# Patient Record
Sex: MALE | Race: WHITE | HISPANIC OR LATINO | Employment: UNEMPLOYED | URBAN - METROPOLITAN AREA
[De-identification: names, ages, dates, MRNs, and addresses within clinical notes are randomized per-mention and may not be internally consistent; named-entity substitution may affect disease eponyms.]

---

## 2017-01-26 ENCOUNTER — TRANSCRIBE ORDERS (OUTPATIENT)
Dept: LAB | Facility: CLINIC | Age: 72
End: 2017-01-26

## 2017-01-26 ENCOUNTER — APPOINTMENT (OUTPATIENT)
Dept: LAB | Facility: CLINIC | Age: 72
End: 2017-01-26
Payer: COMMERCIAL

## 2017-01-26 DIAGNOSIS — A64: Primary | ICD-10-CM

## 2017-01-26 DIAGNOSIS — N50.89 CHYLOCELE OF TUNICA VAGINALIS: ICD-10-CM

## 2017-01-26 DIAGNOSIS — E11.8 DIABETIC COMPLICATION (HCC): ICD-10-CM

## 2017-01-26 LAB
ALBUMIN SERPL BCP-MCNC: 4 G/DL (ref 3.5–5)
ALP SERPL-CCNC: 97 U/L (ref 46–116)
ALT SERPL W P-5'-P-CCNC: 28 U/L (ref 12–78)
ANION GAP SERPL CALCULATED.3IONS-SCNC: 9 MMOL/L (ref 4–13)
AST SERPL W P-5'-P-CCNC: 19 U/L (ref 5–45)
BILIRUB SERPL-MCNC: 0.74 MG/DL (ref 0.2–1)
BUN SERPL-MCNC: 12 MG/DL (ref 5–25)
CALCIUM SERPL-MCNC: 8.9 MG/DL (ref 8.3–10.1)
CHLORIDE SERPL-SCNC: 102 MMOL/L (ref 100–108)
CHOLEST SERPL-MCNC: 182 MG/DL (ref 50–200)
CO2 SERPL-SCNC: 27 MMOL/L (ref 21–32)
CREAT SERPL-MCNC: 1.03 MG/DL (ref 0.6–1.3)
EST. AVERAGE GLUCOSE BLD GHB EST-MCNC: 209 MG/DL
GFR SERPL CREATININE-BSD FRML MDRD: >60 ML/MIN/1.73SQ M
GLUCOSE SERPL-MCNC: 168 MG/DL (ref 65–140)
HBA1C MFR BLD: 8.9 % (ref 4.2–6.3)
HDLC SERPL-MCNC: 69 MG/DL (ref 40–60)
LDLC SERPL CALC-MCNC: 91 MG/DL (ref 0–100)
POTASSIUM SERPL-SCNC: 4.3 MMOL/L (ref 3.5–5.3)
PROT SERPL-MCNC: 8.3 G/DL (ref 6.4–8.2)
SODIUM SERPL-SCNC: 138 MMOL/L (ref 136–145)
TRIGL SERPL-MCNC: 109 MG/DL

## 2017-01-26 PROCEDURE — 86696 HERPES SIMPLEX TYPE 2 TEST: CPT

## 2017-01-26 PROCEDURE — 80061 LIPID PANEL: CPT

## 2017-01-26 PROCEDURE — 87389 HIV-1 AG W/HIV-1&-2 AB AG IA: CPT

## 2017-01-26 PROCEDURE — 83036 HEMOGLOBIN GLYCOSYLATED A1C: CPT

## 2017-01-26 PROCEDURE — 87591 N.GONORRHOEAE DNA AMP PROB: CPT

## 2017-01-26 PROCEDURE — 86592 SYPHILIS TEST NON-TREP QUAL: CPT

## 2017-01-26 PROCEDURE — 36415 COLL VENOUS BLD VENIPUNCTURE: CPT

## 2017-01-26 PROCEDURE — 87491 CHLMYD TRACH DNA AMP PROBE: CPT

## 2017-01-26 PROCEDURE — 80053 COMPREHEN METABOLIC PANEL: CPT

## 2017-01-26 PROCEDURE — 86694 HERPES SIMPLEX NES ANTBDY: CPT

## 2017-01-26 PROCEDURE — 86695 HERPES SIMPLEX TYPE 1 TEST: CPT

## 2017-01-27 LAB
CHLAMYDIA DNA CVX QL NAA+PROBE: NORMAL
HIV 1+2 AB+HIV1 P24 AG SERPL QL IA: NORMAL
HSV1 IGG SER IA-ACNC: 47 INDEX (ref 0–0.9)
HSV2 IGG SER IA-ACNC: 4.3 INDEX (ref 0–0.9)
N GONORRHOEA DNA GENITAL QL NAA+PROBE: NORMAL
RPR SER QL: NORMAL

## 2017-01-28 LAB — HSV1+2 IGM SER IA-ACNC: <0.91 RATIO (ref 0–0.9)

## 2017-08-09 ENCOUNTER — APPOINTMENT (OUTPATIENT)
Dept: LAB | Facility: CLINIC | Age: 72
End: 2017-08-09
Payer: COMMERCIAL

## 2017-08-09 DIAGNOSIS — M25.551 RIGHT HIP PAIN: Primary | ICD-10-CM

## 2017-08-09 LAB
BASOPHILS # BLD AUTO: 0.09 THOUSANDS/ΜL (ref 0–0.1)
BASOPHILS NFR BLD AUTO: 1 % (ref 0–1)
EOSINOPHIL # BLD AUTO: 0.65 THOUSAND/ΜL (ref 0–0.61)
EOSINOPHIL NFR BLD AUTO: 6 % (ref 0–6)
ERYTHROCYTE [DISTWIDTH] IN BLOOD BY AUTOMATED COUNT: 15.3 % (ref 11.6–15.1)
ERYTHROCYTE [SEDIMENTATION RATE] IN BLOOD: 16 MM/HOUR (ref 0–10)
HCT VFR BLD AUTO: 36.4 % (ref 36.5–49.3)
HGB BLD-MCNC: 12 G/DL (ref 12–17)
IGG SERPL-MCNC: 1250 MG/DL (ref 700–1600)
LYMPHOCYTES # BLD AUTO: 3.05 THOUSANDS/ΜL (ref 0.6–4.47)
LYMPHOCYTES NFR BLD AUTO: 29 % (ref 14–44)
MCH RBC QN AUTO: 29 PG (ref 26.8–34.3)
MCHC RBC AUTO-ENTMCNC: 33 G/DL (ref 31.4–37.4)
MCV RBC AUTO: 88 FL (ref 82–98)
MONOCYTES # BLD AUTO: 0.87 THOUSAND/ΜL (ref 0.17–1.22)
MONOCYTES NFR BLD AUTO: 8 % (ref 4–12)
NEUTROPHILS # BLD AUTO: 5.72 THOUSANDS/ΜL (ref 1.85–7.62)
NEUTS SEG NFR BLD AUTO: 56 % (ref 43–75)
NRBC BLD AUTO-RTO: 0 /100 WBCS
PLATELET # BLD AUTO: 257 THOUSANDS/UL (ref 149–390)
PMV BLD AUTO: 10.1 FL (ref 8.9–12.7)
RBC # BLD AUTO: 4.14 MILLION/UL (ref 3.88–5.62)
URATE SERPL-MCNC: 3.6 MG/DL (ref 4.2–8)
WBC # BLD AUTO: 10.41 THOUSAND/UL (ref 4.31–10.16)

## 2017-08-09 PROCEDURE — 84550 ASSAY OF BLOOD/URIC ACID: CPT

## 2017-08-09 PROCEDURE — 86618 LYME DISEASE ANTIBODY: CPT

## 2017-08-09 PROCEDURE — 36415 COLL VENOUS BLD VENIPUNCTURE: CPT

## 2017-08-09 PROCEDURE — 85652 RBC SED RATE AUTOMATED: CPT

## 2017-08-09 PROCEDURE — 85025 COMPLETE CBC W/AUTO DIFF WBC: CPT

## 2017-08-09 PROCEDURE — 82784 ASSAY IGA/IGD/IGG/IGM EACH: CPT

## 2017-08-10 LAB
B BURGDOR IGG SER IA-ACNC: 0.12
B BURGDOR IGM SER IA-ACNC: 0.78

## 2018-01-10 NOTE — PROGRESS NOTES
Assessment    1  Type 2 diabetes mellitus with hyperglycemia (250 00) (E11 65)   2  Benign essential hypertension (401 1) (I10)   3  Hyperlipidemia (272 4) (E78 5)    Plan  Benign essential hypertension, Hyperlipidemia, Type 2 diabetes mellitus with  hyperglycemia    · Follow-up visit in 4 Months Evaluation and Treatment  Follow-up  Status: Hold For -  Scheduling  Requested for: 27GXH6802   Ordered; For: Benign essential hypertension, Hyperlipidemia, Type 2 diabetes mellitus with hyperglycemia; Ordered By: Madie Zavala Performed:  Due: 06ZGC2654  Type 2 diabetes mellitus with hyperglycemia    · BD Insulin Syringe Ultrafine 31G X 5/16" 0 5 ML Miscellaneous   Rx By: Genet Mack; Dispense: 100 Days ; #:1 X 100 EA Box; Refill: 3; For: Type 2 diabetes mellitus with hyperglycemia; ERIKA = N; Sent To: [facility] ; Msg to Pharmacy: Please give/fill patient Freestyle Meter and strips from 3/12/13 if he has not picked up already  Thank you ; Last Updated By: Madie Zavala; 3/8/2016 12:22:53 PM    Discussion/Summary  Discussion Summary:   1  Type 2 diabetes with hyperglycemia- be compliant with insulin regimen and do not self adjust  Unable to make adjustments due to being non-compliant  Take Lantus 34 units at bedtime daily, use 10 units before each meal and continue Metformin  Send in a log in 2 weeks for review  Please have labs done as requested  Send us a copy of medical records [facility]  2  Hypertension- BP stable, continue current regimen per cardiology  3  Hyperlipidemia- continue Atorvastatin, have lipid panel done  Follow-up in 4 months  Counseling Documentation With Imm: The patient was counseled regarding diagnostic results, instructions for management, risk factor reductions, patient and family education, impressions, importance of compliance with treatment  Medication SE Review and Pt Understands Tx: Possible side effects of new medications were reviewed with the patient/guardian today  The treatment plan was reviewed with the patient/guardian  The patient/guardian understands and agrees with the treatment plan      Chief Complaint  Chief Complaint Free Text Note Form: follow-up diabetes   Chief Complaint Chronic Condition [facility]: Patient is here today for follow up of chronic conditions described in HPI  History of Present Illness  HPI: Maxi Florez is a 68 yo male here for follow-up type 2 diabetes, reports running out of insulin and decreasing dose of Metformin  On Lantus 34 units at bedtime, Novolog 8-10-12-0 and Metformin  Reports blood sugar checks 4 times a day but meter shows checks from none to 2 times a day, ranging from 103-388  Did not have lab work done as requested from last visit  Reports hospital visit at [address]  Hypertension (Follow-Up): The patient states he has been doing well with his blood pressure control since the last visit  Symptoms: The patient is currently asymptomatic  Associated symptoms include no headache  The patient is due for a lipid panel and a serum creatinine  Hyperlipidemia (Follow-Up): Comorbid Illnesses: diabetes mellitus and hypertension  Symptoms: The patient is currently asymptomatic  Medications: the patient is adherent with his medication regimen  Medication(s): a statin  Diabetes: The patient is being seen for routine follow-up of Diabetes Mellitus 2  See Medication List for current medication(s)  See Medication List for dosage(s)  Hospital Based Practices Required Assessment:   Pain Assessment   the patient states they do not have pain  Depression And Suicide Screen  No, the patient has not had thoughts of hurting themself  No, the patient has not felt depressed in the past 7 days  Prefered Language is  Guinean  Primary Language is  Guinean        Review of Systems  Endo Adult ROS Male Established v2 - [facility]:   Constitutional/General: no recent weight gain, no recent weight loss, no poor energy/fatigue, no increased energy level and no insomnia/sleep problems  Heart: high blood pressure and chest pain/tightness  Genitourinary - Urinary frequent urination, but no excess urination and no urinating during the night  Eyes: no blurred vision and no double vision  Mouth / Throat: no hoarseness and no difficulty swallowing  Respiratory: no persistent cough  Endocrine: no feeling hot frequently, no feeling cold frequently, no shifts between feeling hot and cold, no cold hands or feet, blood sugar problems, no excessive thirst, no excessive hunger, no change in shoe size, no nausea or vomiting and no shaky hands  Active Problems    1  Abdominal pain (789 00) (R10 9)   2  Arteriosclerosis of coronary artery (414 00) (I25 10)   3  Asthma (493 90) (J45 909)   4  Benign essential hypertension (401 1) (I10)   5  Cataract (366 9) (H26 9)   6  Cerebrovascular accident (CVA) due to other mechanism (434 91) (I63 8)   7  Cervicalgia (723 1) (M54 2)   8  Diverticulosis (562 10) (K57 90)   9  Dyspepsia (536 8) (K30)   10  Enlarged prostate without lower urinary tract symptoms (luts) (600 00) (N40 0)   11  Exertional angina (413 9) (I20 8)   12  Gastroesophageal reflux disease with esophagitis (530 11) (K21 0)   13  Hyperlipidemia (272 4) (E78 5)   14  Insomnia (780 52) (G47 00)   15  Limb pain (729 5) (M79 609)   16  Lower abdominal pain (789 09) (R10 30)   17  Lower back pain (724 2) (M54 5)   18  Pain in joint of left shoulder (719 41) (M25 512)   19  Thoracic back pain (724 1) (M54 6)   20  Type 2 diabetes mellitus with hyperglycemia (250 00) (E11 65)   21  Type 2 diabetes mellitus with other circulatory complications (219 18) (R67 15)   22  Varicose Veins Of Lower Extremities (454 9)   23  Visit for pre-operative examination (V72 84) (H07 065)    Past Medical History    1  History of allergic rhinitis (V12 69) (Z87 09)   2  History of pharyngitis (V12 69) (Z87 09)   3  History of Need for influenza vaccination (V04 81) (Z23)   4   History of Need for Tdap vaccination (V06 1) (Z23)   5  History of Tinea corporis (110 5) (B35 4)  Active Problems And Past Medical History Reviewed: The active problems and past medical history were reviewed and updated today  Surgical History    1  History of Back Surgery   2  History of Cataract Surgery   3  Hernia Repair   4  History of Transurethral Resection Of Prostate (TURP)  Surgical History Reviewed: The surgical history was reviewed and updated today  Family History    1  Family history of Diabetes Mellitus (V18 0)    2  Family history of kidney disease (V18 69) (Z84 1)  Family History Reviewed: The family history was reviewed and updated today  Social History    · Denied: History of Alcohol use   · Denied: History of Drug Use   · Former smoker (V15 82) (R86 338)   · Uses Safety Equipment - Seatbelts  Social History Reviewed: The social history was reviewed and updated today  Current Meds   1  Acetaminophen 500 MG Oral Tablet; TAKE 1 TABLET EVERY 4 TO 6 HOURS AS   NEEDED; Therapy: 22Apr2015 to (Wale Livingston)  Requested for: 22Apr2015; Last   Rx:22Apr2015 Ordered   2  AmLODIPine Besylate 10 MG Oral Tablet; TOME 1 TABLETA TODOS LOS D? AS PARA LA   PRESION ALTERIAL  TAKE 1 TABLETDAILY FOR BLOOD PRESSURE; Therapy: 39PAC8521 to (Wale Livingston)  Requested for: 12RYX4214; Last   Rx:29Jan2016 Ordered   3  Aspirin 81 MG Oral Tablet; TAKE 1 TABLET DAILY; Therapy: 15IJR6977 to (Evaluate:33Skq3731)  Requested for: 55TPW3453; Last   Rx:08Jan2016 Ordered   4  Atorvastatin Calcium 80 MG Oral Tablet; TAKE 1 TABLET DAILY; Therapy: 44UZX1592 to (Evaluate:02Jan2017)  Requested for: 49APT6591; Last   Rx:08Jan2016 Ordered   5  BD Insulin Syringe Ultrafine 31G X 5/16" 0 5 ML Miscellaneous; Use daily with insulin; Therapy: 66Xrr4226 to (Evaluate:27Anq6075)  Requested for: 93LGF5920; Last   Rx:16Apr2013 Ordered   6  BD Pen Needle Airam U/F 32G X 4 MM Miscellaneous;    Therapy: 22Apr2014 to Recorded   7  Comfort EZ Pen Needles 31G X 6 MM Miscellaneous; Therapy: 12NWN5971 to Recorded   8  FreeStyle Freedom Lite w/Device Kit; Test blood sugar once daily before a meal;   Therapy: 44Lce4823 to (Evaluate:13Tea6166)  Requested for: 61MYM6823; Last   Rx:16Apr2013 Ordered   9  FreeStyle Lancets Miscellaneous; USE AS DIRECTED; Therapy: 65CUF7670 to (Evaluate:15Mar2014)  Requested for: 77KTP7562; Last   Rx:14Jan2014 Ordered   10  FreeStyle Lite Device; Therapy: 93WPS7645 to Recorded   11  FreeStyle Test In Vitro Strip; USE 1 STRIP 3 TIMES DAILY; Therapy: 59RBR5687 to (Evaluate:22Jun2014)  Requested for: 48KUB5144; Last    Rx:24Mar2014 Ordered   12  HumaLOG 100 UNIT/ML Subcutaneous Solution; 10 units SQ before breakfast, lunch    and dinner; Therapy: 71HLG9008 to (Evaluate:61Ozs7524); Last Rx:05Feb2016 Ordered   13  HumaLOG KwikPen 100 UNIT/ML Subcutaneous Solution Pen-injector; INJECT    SUBCUTANEOUSLY AS DIRECTED; Therapy: 74TUH7469 to (Evaluate:04Jun2016); Last Rx:86Lzl9900 Ordered   14  Ketoconazole 2 % External Cream; APPLY A THIN LAYER TO AFFECTED AREA(S) TWICE    DAILY; Therapy: 13Ell4176 to (Evaluate:50Ydr6281)  Requested for: 57Jac2530; Last    Rx:12Sep2014 Ordered   15  Ketorolac Tromethamine 0 5 % Ophthalmic Solution; Therapy: 76ION4736 to Recorded   16  Lantus 100 UNIT/ML Subcutaneous Solution; take 34 units at bedtime; Therapy: 80Rpp2338 to (Evaluate:01Jun2016)  Requested for: 45BCC9691; Last    Rx:03Mar2016 Ordered   17  Lisinopril 20 MG Oral Tablet; TOME 1 TABLET BY MOUTH TODOS LOS D? AS FOR    BLOOD PRESSURE  TAKE 1 TABLET BY MOUTH DAILY FOR BLOOD PRESSURE; Therapy: 96GJN6502 to (Concepción Bartholomew)  Requested for: 09CFK3953; Last    Rx:08Jan2016 Ordered   25  Melatonin 1 MG Oral Tablet; TAKE 1 TABLET Bedtime PRN insomnia; Therapy: 42YWT2118 to (Evaluate:01Dir4968)  Requested for: 18TOZ4364; Last    Rx:08Jan2016 Ordered   19   MetFORMIN HCl - 1000 MG Oral Tablet; TAKE 1 TABLET TWICE DAILY WITH MEALS; Therapy: 13BSD2772 to (Evaluate:31Mar2016)  Requested for: 50YCU0353; Last    Rx:01Mar2016 Ordered   20  Metoprolol Tartrate 25 MG Oral Tablet; TAKE 1 TABLET TWICE DAILY; Therapy: 78ZTC8222 to (Evaluate:97Qsb9385)  Requested for: 84SCB8196; Last    Rx:08Jan2016 Ordered   21  Naproxen 375 MG Oral Tablet; TAKE 1 TABLET EVERY 12 HOURS AS NEEDED; Therapy: 83IIK5996 to (Evaluate:17Jan2016)  Requested for: 54QNY1858; Last    Rx:19Oct2015 Ordered   22  Nitrostat 0 4 MG Sublingual Tablet Sublingual; place 1 tablet under the tongue every 5    minutes for up to 3 doses as needed for chest pain  call 911 if pain persists; Therapy: 07Yqo8088 to (Aamir Jones)  Requested for: 44XKF5967; Last    Rx:02Mar2016 Ordered   23  Omeprazole 20 MG Oral Capsule Delayed Release; TAKE 1 CAPSULE DAILY EVERY    MORNING BEFORE BREAKFAST; Therapy: 57ADA9516 to (Felix Arias)  Requested for: 64UBN7795; Last    Rx:08Jan2016 Ordered   24  Pharmacist Choice Alcohol Pad; Therapy: 97JQO1426 to Recorded   25  Polymyxin B-Trimethoprim 99982-1 1 UNIT/ML-% Ophthalmic Solution; Therapy: 28UVP6876 to Recorded   26  PrednisoLONE Acetate 1 % Ophthalmic Suspension; Therapy: 86VNY5861 to Recorded   27  Proventil  (90 Base) MCG/ACT Inhalation Aerosol Solution; INHALE 1 TO 2    PUFFS EVERY 4 TO 6 HOURS AS NEEDED; Therapy: 86IDK2557 to (Evaluate:01Feb2015); Last Rx:02Jan2015 Ordered   28  Tamsulosin HCl - 0 4 MG Oral Capsule; TOME 1 CAPSULE POR VIA ORAL    DIARIAMENTETAKE 1 CAPSULE BY MOUTH ONCE A DAY; Therapy: 23Ild2223 to (Felix Arias)  Requested for: 20YSD3412; Last    Rx:07Feb2016 Ordered   29  Visine Advanced Relief 0 05-0 1-1-1 % Ophthalmic Solution; use two drops per eye as    needed for irritation and dryness; Therapy: 89OBD7989 to (Last Rx:12Mar2013) Ordered  Medication List Reviewed: The medication list was reviewed and updated today  Allergies    1   No Known Drug Allergies    2  Latex    Vitals  Vital Signs [Data Includes: Current Encounter]    Recorded: [id number] 11:15AM   Temperature 98 4 F   Heart Rate 86   Systolic 010   Diastolic 58   Height 5 ft 7 in   Weight 180 lb 5 36 oz   BMI Calculated 28 24   BSA Calculated 1 94     Physical Exam    Constitutional   General appearance: No acute distress, well appearing and well nourished  Eyes   Conjunctiva and lids: No swelling, erythema, or discharge  Pupils: Equal, round and reactive to light  The sclera are anicteric  Extraocular movements are intact  Ears, Nose, Mouth, and Throat   External inspection of ears, nose and lips: Normal     Oropharynx: Normal with no erythema, edema, exudate or lesions  Exam of Head: The head is atraumatic and normocephalic  Neck: The neck is supple  The thyroid is normal in size with no palpable nodules  Pulmonary   Auscultation of lungs: Clear to auscultation bilaterally with normal chest expansion  Cardiovascular   Auscultation of heart: Normal rate and rhythm with no murmurs, gallops or rubs  Examination of pulses: Dorsalis pedal pulses are +2 and equal bilaterally  Examination of Carotids: No bruits  Abdomen   Abdomen: Abnormal   The abdomen was obese  Lymphatic   Palpation of lymph nodes: No supraclavicular or suboccipital lymphadenopathy  Musculoskeletal   Inspection/palpation of joints, bones, and muscles: Muscle bulk and tone is normal     Skin   Skin and subcutaneous tissue: Abnormal     Neurologic   Reflexes: 2+ and symmetric  Motor Strength: Strength is 5/5 bilaterally  Psychiatric   Orientation to person, place and time: Normal     Mood and affect: Affect and attention span are normal     Diabetic Foot Exam   Patitent was examined with shoes and socks off today  Patitent has no lesions on the feet         Future Appointments    Date/Time Provider Specialty Site   04/05/2016 09:40 AM Specialty Clinic, Urology  [facility]   08/03/2016 10:20 AM Specialty Clinic, Endocrinology  [facility]   08/05/2016 11:15 AM Kirk Huynh MD Cardiology [facility] [place]     Signatures   Electronically signed by : Luis Francis, ; Mar  8 2016 12:22PM EST                       (Author)    Electronically signed by : RONI Kenny ; Mar  8 2016  1:07PM EST                       (Author)

## 2018-01-10 NOTE — PROGRESS NOTES
Assessment    1  Enlarged prostate without lower urinary tract symptoms (luts) (600 00) (N40 0)   2  Fungal skin infection (111 9) (B36 9)    Plan  Fungal skin infection    · Clotrimazole-Betamethasone 1-0 05 % External Cream; APPLY SPARINGLY TO  THE AFFECTED AREA(S) TWICE DAILY   Rx By: Denise Palomino; Dispense: 0 Days ; #:1 X 45 GM Tube; Refill: 0; For: Fungal skin infection; ERIKA = N; Verified Transmission to [facility]; Last Updated By: System, SureScript[employer]; 4/5/2016 9:37:27 AM   · Follow-up PRN Evaluation and Treatment  Follow-up  Status: Complete  Done:  91WLU3500   Ordered; For: Fungal skin infection; Ordered By: Denise Palomino Performed:  Due: 65GTP9271  Health Maintenance, Enlarged prostate without lower urinary tract symptoms (luts)    · Tamsulosin HCl - 0 4 MG Oral Capsule; TAKE 1 CAPSULE Bedtime   Rx By: Denise Palomino; Dispense: 30 Days ; #:30 Capsule; Refill: 11; For: Health Maintenance, Enlarged prostate without lower urinary tract symptoms (luts); ERIKA = N; Verified Transmission to [facility]; Last Updated By: System, SureScripts; 4/5/2016 9:17:33 AM   · (1) PSA, DIAGNOSTIC (FOLLOW-UP); Status:Active; Requested for:05Apr2016;    Perform:[facility]; Due:05Apr2017;Ordered;  For:Health Maintenance, Enlarged prostate without lower urinary tract symptoms (luts); Ordered By:Antonietta Dawn; Discussion/Summary  Discussion Summary:   70 y/o male presents for annual follow up of BPH  He is well controlled on Flomax and has had a procedure (likely TURP) in [place] in 2012  He has not had his PSA checked since 2014 and is given a slip today  He is to continue annual follow up by his PCP  He has a secondary complaint of a skin rash on his scrotum/ base of penis  He is given Lotrisone cream and to follow up with us on an as needed basis        Chief Complaint  Chief Complaint Free Text Note Form: urinary follow up, i have a rash on my skin History of Present Illness  HPI: 68 y/o male presents for his annual follow up of his BPH  He does get up 2-3 times per night but attributes this to his DM  He states his urinary stream is good since he had what sounds like a TURP in [place] in 2012  He has not had a repeat PSA since April of 2014  He is instructed to obtain this and follow up annually with his PCP for his BPH  He does have a complaint of an itchy rash on his upper scrotum next to the base of her penis  He states this rash is itchy and has been present for about a year  No treatment  Hospital Based Practices Required Assessment:   Pain Assessment   the patient states they do not have pain  Prefered [place] [facility]  [facility] [place]  Primary Language is  [place]  Review of Systems  Complete-Male Urology:   Constitutional: No fever or chills, feels well, no tiredness, no recent weight gain or weight loss  Genitourinary: nocturia, but as noted in HPI, no dysuria, no urinary hesitancy, no hematuria and no incontinence  Integumentary: a rash and itching, but as noted in HPI  ROS Reviewed:   ROS reviewed  Active Problems    1  Abdominal pain (789 00) (R10 9)   2  Arteriosclerosis of coronary artery (414 00) (I25 10)   3  Asthma (493 90) (J45 909)   4  Benign essential hypertension (401 1) (I10)   5  Cataract (366 9) (H26 9)   6  Cerebrovascular accident (CVA) due to other mechanism (434 91) (I63 8)   7  Cervicalgia (723 1) (M54 2)   8  Diverticulosis (562 10) (K57 90)   9  Dyspepsia (536 8) (K30)   10  Enlarged prostate without lower urinary tract symptoms (luts) (600 00) (N40 0)   11  Exertional angina (413 9) (I20 8)   12  Gastroesophageal reflux disease with esophagitis (530 11) (K21 0)   13  Hyperlipidemia (272 4) (E78 5)   14  Insomnia (780 52) (G47 00)   15  Limb pain (729 5) (M79 609)   16  Lower abdominal pain (789 09) (R10 30)   17  Lower back pain (724 2) (M54 5)   18  Pain in joint of left shoulder (719 41) (M25 512)   19   Thoracic back pain (724 1) (M54 6)   20  Type 2 diabetes mellitus with hyperglycemia (250 00) (E11 65)   21  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)   22  Varicose Veins Of Lower Extremities (454 9)   23  Visit for pre-operative examination (V72 84) (N30 531)    Past Medical History    1  History of allergic rhinitis (V12 69) (Z87 09)   2  History of pharyngitis (V12 69) (Z87 09)   3  History of Need for influenza vaccination (V04 81) (Z23)   4  History of Need for Tdap vaccination (V06 1) (Z23)   5  History of Tinea corporis (110 5) (B35 4)  Active Problems And Past Medical History Reviewed: The active problems and past medical history were reviewed and updated today  Surgical History    1  History of Back Surgery   2  History of Cataract Surgery   3  Hernia Repair   4  History of Transurethral Resection Of Prostate (TURP)  Surgical History Reviewed: The surgical history was reviewed and updated today  Family History    1  Family history of Diabetes Mellitus (V18 0)    2  Family history of kidney disease (V18 69) (Z84 1)  Family History Reviewed: The family history was reviewed and updated today  Social History    · Denied: History of Alcohol use   · Denied: History of Drug Use   · Former smoker (V15 82) (S83 349)   · Uses Safety Equipment - Seatbelts  Social History Reviewed: The social history was reviewed and updated today  The social history was reviewed and is unchanged  Current Meds   1  Acetaminophen 500 MG Oral Tablet; TAKE 1 TABLET EVERY 4 TO 6 HOURS AS   NEEDED; Therapy: 22Apr2015 to (Rosa Mina)  Requested for: 22Apr2015; Last   Rx:22Apr2015 Ordered   2  AmLODIPine Besylate 10 MG Oral Tablet; TOME 1 TABLETA TODOS LOS D? AS PARA LA   PRESION ALTERIAL  TAKE 1 TABLETDAILY FOR BLOOD PRESSURE; Therapy: 08VZS8080 to (Dorota June)  Requested for: 08OQR4455; Last   Rx:29Jan2016 Ordered   3   Aspirin 81 MG Oral Tablet; TAKE 1 TABLET DAILY; Therapy: 54FEQ5221 to (Evaluate:59Voc1407)  Requested for: 02AXB7288; Last   Rx:08Jan2016 Ordered   4  Atorvastatin Calcium 80 MG Oral Tablet; TAKE 1 TABLET DAILY; Therapy: 64YPO1639 to (Evaluate:02Jan2017)  Requested for: 21EHI5113; Last   Rx:08Jan2016 Ordered   5  BD Pen Needle Airam U/F 32G X 4 MM Miscellaneous; Therapy: 86QEE9496 to Recorded   6  Comfort EZ Pen Needles 31G X 6 MM Miscellaneous; Therapy: 09LWO0278 to Recorded   7  FreeStyle Freedom Lite w/Device Kit; Test blood sugar once daily before a meal;   Therapy: 89Iru7013 to (Evaluate:16May2013)  Requested for: 83WNI3199; Last   Rx:16Apr2013 Ordered   8  FreeStyle Lancets Miscellaneous; USE AS DIRECTED; Therapy: 23GGE9836 to (Evaluate:15Mar2014)  Requested for: 80GLL4386; Last   Rx:14Jan2014 Ordered   9  FreeStyle Lite Device; Therapy: 54FPQ3980 to Recorded   10  FreeStyle Test In Vitro Strip; USE 1 STRIP 3 TIMES DAILY; Therapy: 64MOE5086 to (Evaluate:22Jun2014)  Requested for: 86ZTX2370; Last    Rx:24Mar2014 Ordered   11  HumaLOG 100 UNIT/ML Subcutaneous Solution; 10 units SQ before breakfast, lunch    and dinner; Therapy: 43NTW8172 to (Evaluate:02Sep2016); Last Rx:05Feb2016 Ordered   12  HumaLOG KwikPen 100 UNIT/ML Subcutaneous Solution Pen-injector; INJECT    SUBCUTANEOUSLY AS DIRECTED; Therapy: 10BQH6482 to (Evaluate:04Jun2016); Last Rx:26Tzp6953 Ordered   13  Ketoconazole 2 % External Cream; APPLY A THIN LAYER TO AFFECTED AREA(S) TWICE    DAILY; Therapy: 19Oqi5515 to (Evaluate:97Zsv1690)  Requested for: 65Nto3623; Last    Rx:12Sep2014 Ordered   14  Ketorolac Tromethamine 0 5 % Ophthalmic Solution; Therapy: 21CJA8095 to Recorded   15  Lantus 100 UNIT/ML Subcutaneous Solution; take 34 units at bedtime; Therapy: 09Roa3235 to (Evaluate:01Jun2016)  Requested for: 18WJZ7247; Last    Rx:03Mar2016 Ordered   16  Lisinopril 20 MG Oral Tablet; TOME 1 TABLET BY MOUTH PATSY BAUTISTA? AS FOR    BLOOD PRESSURE  TAKE 1 TABLET BY MOUTH DAILY FOR BLOOD PRESSURE; Therapy: 42VYA5952 to (Vicky Barrios)  Requested for: 30GKF7106; Last    Rx:08Jan2016 Ordered   17  Melatonin 1 MG Oral Tablet; TAKE 1 TABLET Bedtime PRN insomnia; Therapy: 86DGY0426 to (Evaluate:07Feb2016)  Requested for: 31RUH5419; Last    Rx:08Jan2016 Ordered   18  MetFORMIN HCl - 1000 MG Oral Tablet; TAKE 1 TABLET TWICE DAILY WITH MEALS; Therapy: 21UBG0895 to (Evaluate:31Mar2016)  Requested for: 55NXA5712; Last    Rx:01Mar2016 Ordered   19  Metoprolol Tartrate 25 MG Oral Tablet; TAKE 1 TABLET TWICE DAILY; Therapy: 48JZK6115 to (Evaluate:77Xzz8103)  Requested for: 62DTT9779; Last    Rx:08Jan2016 Ordered   20  Naproxen 375 MG Oral Tablet; TAKE 1 TABLET EVERY 12 HOURS AS NEEDED; Therapy: 26ARK6816 to (Evaluate:17Jan2016)  Requested for: 13UVY8013; Last    Rx:19Oct2015 Ordered   21  Nitrostat 0 4 MG Sublingual Tablet Sublingual; place 1 tablet under the tongue every 5    minutes for up to 3 doses as needed for chest pain  call 911 if pain persists; Therapy: 42Eum7831 to (Layla Lim)  Requested for: 15MFX0609; Last    Rx:02Mar2016 Ordered   22  Omeprazole 20 MG Oral Capsule Delayed Release; TAKE 1 CAPSULE DAILY EVERY    MORNING BEFORE BREAKFAST; Therapy: 67LQS8969 to (Vicky Barrios)  Requested for: 74EKO3431; Last    Rx:08Jan2016 Ordered   23  Pharmacist Choice Alcohol Pad; Therapy: 42JVJ0089 to Recorded   24  Polymyxin B-Trimethoprim 04646-2 1 UNIT/ML-% Ophthalmic Solution; Therapy: 00VBU0598 to Recorded   25  PrednisoLONE Acetate 1 % Ophthalmic Suspension; Therapy: 14OBN8915 to Recorded   26  Proventil  (90 Base) MCG/ACT Inhalation Aerosol Solution; INHALE 1 TO 2    PUFFS EVERY 4 TO 6 HOURS AS NEEDED; Therapy: 39WAP7894 to (Evaluate:01Feb2015); Last Rx:02Jan2015 Ordered   27  Tamsulosin HCl - 0 4 MG Oral Capsule; TOME 1 CAPSULE POR VIA ORAL    DIARIAMENTETAKE 1 CAPSULE BY MOUTH ONCE A DAY;     Therapy: 34Ztd7973 to (Evaluate:00Try8973)  Requested for: 01Apr2016; Last    Rx:01Apr2016 Ordered   28  Visine Advanced Relief 0 05-0 1-1-1 % Ophthalmic Solution; use two drops per eye as    needed for irritation and dryness; Therapy: 97MUL0821 to (Last Rx:12Mar2013) Ordered  Medication List Reviewed: The medication list was reviewed and updated today  Allergies    1  No Known Drug Allergies    2  Latex    Vitals  Vital Signs [Data Includes: Current Encounter]    Recorded: 05Apr2016 09:00AM   Temperature 98 1 F   Heart Rate 78   Systolic 660   Diastolic 64   Height 5 ft 7 in   Weight 183 lb 6 75 oz   BMI Calculated 28 73   BSA Calculated 1 95     Physical Exam    Constitutional   General appearance: No acute distress, well appearing and well nourished  Abdomen   Abdomen: Non-tender, no masses  Genitourinary   Anus and perineum: Normal     Scrotum contents: Abnormal   Scrotum findings: lesions and likely fungal rash, irritated, raised skin 2x3cm,  Penis: Normal, no lesions  Digital rectal exam of prostate: Abnormal   The prostate was enlarged, but was normal, had no palpable nodules, was nontender and was not fluctuant  Skin   Examination of the skin for lesions: Abnormal   (fungal type rash base of penis/scrotal area 2x3cm  )      Future Appointments    Date/Time Provider Specialty Site   08/03/2016 10:20 AM Specialty Clinic, Endocrinology  [place] [id number]   06/10/2016 11:15 AM Nicole Dutton MD Cardiology [place] [address]     Signatures   Electronically signed by : Lewie Mortimer, [facility];  Apr 5 2016  9:46AM EST                       (Author)    Electronically signed by : RONI Morrell ; Apr 5 2016  9:56AM EST

## 2018-01-11 NOTE — PROGRESS NOTES
Assessment  Assessed    1  Arteriosclerosis of coronary artery (414 00) (I25 10)   2  Benign essential hypertension (401 1) (I10)   3  Cerebrovascular accident (CVA) due to other mechanism (434 91) (I63 8)   4  Type 2 diabetes mellitus with hyperglycemia (250 00) (E11 65)   5  Acid indigestion (536 8) (K30)    Plan  Acid indigestion    · Omeprazole 20 MG Oral Capsule Delayed Release; TAKE 1 CAPSULE TWICE  DAILY   Rx By: Kirk Huynh; Dispense: 30 Days ; #:60 Capsule Delayed Release; Refill: 5; For: Acid indigestion; REIKA = N; Verified Transmission to [facility]; Last Updated By: SystemWAYN; 4/29/2016 11:43:47 AM   · Follow-up visit in 6 months Evaluation and Treatment  Follow-up  Status: Hold For -  Scheduling  Requested for: 04Goy1769   Ordered; For: Acid indigestion; Ordered By: Kirk Huynh Performed:  Due: 66LMI8768                          Follow-up visit in 1 month Evaluation and Treatment  Follow-up  Status: Hold For - Scheduling  Requested for: 29BAH1743  Ordered; For: Benign Essential Hypertension (401 1); Ordered By: Kirk Huynh  Performed:   Due: 15XRN6129   Exercise Stress Echocardiogram Test  Status: Hold For - Scheduling  Requested for: 51IKP6299  Ordered; For: Exertional Angina (413 9); Ordered By: Kirk Huynh  Perform[facility]am[facility]  Due: 62USN2142  Follow-up visit in 2 weeks Evaluation and Treatment  Follow-up  Status: Hold For - Scheduling  Requested for: 94LMR8673  Ordered; For: Exertional Angina (413 9); Ordered By: Kirk Huynh  Performed:   Due: 98RDV2217     Discussion/Summary  Cardiology Discussion Summary Free Text Note Form [facility]:   Andrey Ramos is asymptomatic from his coronary disease  He denies any exertional or rest angina  He is reporting peripheral neuropathy  He has been started on insulin therapy and is followed by endocrinology  We will allow for some time for glycemic control   if he remains symptomatic therapy for neuropathy can be initiated  He denies any heart failure symptoms  I asked him to resume his metoprolol as the reason for discontinuation is unclear  He will also take omeprazol 20mg BID for his heartburn symptoms  I asked him to perform labs  RTC in 6 months  Counseling Documentation With Imm: The patient was counseled regarding diagnostic results, instructions for management, risk factor reductions, prognosis, patient and family education, risks and benefits of treatment options, importance of compliance with treatment  total time of encounter was 45 minutes and 35 minutes was spent counseling  Medication SE Review and Pt Understands Tx: Possible side effects of new medications were reviewed with the patient/guardian today  The treatment plan was reviewed with the patient/guardian  The patient/guardian understands and agrees with the treatment plan      Chief Complaint  Chief Complaint Free Text Note Form: Follow up hospital stay   Chief Complaint Chronic Condition [facility]: Patient is here today for follow up of chronic conditions described in HPI  History of Present Illness  Cardiology HPI Free Text Note Form [facility]: Patient is a 76 yo [place] male with PMH of HTN HLD IDDM BPH and an MI in 2000 and CVA in 2015  Patient had some exertional dyspnea and arm numbness which triggered an exercise stress echo  Stress test were suggestive of possible stress-induced ischemia of the entire inferior wall which had triggered a catheterization on 12/23/2013  LHC had revealed non-intervenable RCA disease and medical tx was recommended  Patient was recently presented to [facility] with complaints of LE burning and tingling sensation  at the time of the event he also reported heart burn sympstoms  Patient was observed and his CE were checked which were normal  In terms of his cardiac disease he has been asymptomatic and feels well   He is able to tolerate moderate exercise with no associated angina and his exercise tolerance is >3 flights of stairs at this time and he reports walking daily  He has lost about 5 lbs with diet and exercise  He denies any HF symptoms  While in hospital patients metoprolol was stopped for an unclear reason  He has tolerated his other meds well  Currently patient denies orthopnea or PND  He denies any chest pain at rest  Denies LE edema  No other complaints   Hospital Based Practices Required Assessment:   Pain Assessment   the patient states they do not have pain  (on a scale of 0 to 10, the patient rates the pain at 0 )   Abuse And Domestic Violence Screen    Yes, the patient is safe at home  The patient states no one is hurting them  Depression And Suicide Screen  No, the patient has not had thoughts of hurting themself  No, the patient has not felt depressed in the past 7 days  Prefered Language is  [place] Islander  Primary Language is  [place]  Review of Systems  Cardiology Male ROS:     Cardiac: as noted in HPI, no chest pain, no rhythm problems, no fainting/blackouts, no heart murmur present, no signs of swelling and no palpitations present  Skin: No complaints of nonhealing sores or skin rash  Psychological: No complaints of feeling depressed, anxiety, panic attacks, or difficulty concentrating  General: no changes in weight, no lack of energy/fatigue, no fever and no frequent infections  Respiratory: no shortness of breath, no cough/sputum, no wheezing, no phlegm and no hemoptysis   History of Asthma  HEENT: no throat problems   Gastrointestinal: No complaints of liver problems, nausea, vomiting, heartburn, constipation, bloody stools, diarrhea, problems swallowing, adbominal pain, or rectal bleeding  Hematologic: No complaints of bleeding disorders, anemia, blood clots, or excessive brusing  Neurological: No complaints of numbness, tingling, dizziness, weakness, seizures, headaches, syncope or fainting, AM fatigue, daytime sleepiness, no witnessed apnea episodes  Musculoskeletal: rash on the thigh  ROS Reviewed:   ROS reviewed  Active Problems  Problems    1  Abdominal pain (789 00) (R10 9)   2  Acid indigestion (536 8) (K30)   3  Arteriosclerosis of coronary artery (414 00) (I25 10)   4  Asthma (493 90) (J45 909)   5  Benign essential hypertension (401 1) (I10)   6  Cataract (366 9) (H26 9)   7  Cerebrovascular accident (CVA) due to other mechanism (434 91) (I63 8)   8  Cervicalgia (723 1) (M54 2)   9  Diverticulosis (562 10) (K57 90)   10  Enlarged prostate without lower urinary tract symptoms (luts) (600 00) (N40 0)   11  Exertional angina (413 9) (I20 8)   12  Fungal skin infection (111 9) (B36 9)   13  Gastroesophageal reflux disease with esophagitis (530 11) (K21 0)   14  Hyperlipidemia (272 4) (E78 5)   15  Insomnia (780 52) (G47 00)   16  Limb pain (729 5) (M79 609)   17  Lower abdominal pain (789 09) (R10 30)   18  Lower back pain (724 2) (M54 5)   19  Pain in joint of left shoulder (719 41) (M25 512)   20  Thoracic back pain (724 1) (M54 6)   21  Type 2 diabetes mellitus with hyperglycemia (250 00) (E11 65)   22  Type 2 diabetes mellitus with other circulatory complications (647 67) (C73 75)   23  Varicose Veins Of Lower Extremities (454 9)   24  Visit for pre-operative examination (V72 84) (V77 097)    Past Medical History  Problems    1  History of allergic rhinitis (V12 69) (Z87 09)   2  History of pharyngitis (V12 69) (Z87 09)   3  History of Need for influenza vaccination (V04 81) (Z23)   4  History of Need for Tdap vaccination (V06 1) (Z23)   5  History of Tinea corporis (110 5) (B35 4)  Active Problems And Past Medical History Reviewed: The active problems and past medical history were reviewed and updated today  Surgical History  Problems    1  History of Back Surgery   2  History of Cataract Surgery   3  Hernia Repair   4  History of Transurethral Resection Of Prostate (TURP)  Surgical History Reviewed:    The surgical history was reviewed and updated today  Family History  Father    1  Family history of Diabetes Mellitus (V18 0)  Sister    2  Family history of kidney disease (V18 69) (Z84 1)  Family History Reviewed: The family history was reviewed and updated today  Social History  Problems    · Denied: History of Alcohol use   · Denied: History of Drug Use   · Former smoker (V15 82) (Q65 566)   · Uses Safety Equipment - Seatbelts  Social History Reviewed: The social history was reviewed and updated today  Current Meds   1  Acetaminophen 500 MG Oral Tablet; TAKE 1 TABLET EVERY 4 TO 6 HOURS AS   NEEDED; Therapy: 22Apr2015 to (Josef Santoro)  Requested for: 22Apr2015; Last   Rx:22Apr2015 Ordered   2  AmLODIPine Besylate 10 MG Oral Tablet; TOME 1 TABLETA TODOS LOS D? AS PARA LA   PRESION ALTERIAL  TAKE 1 TABLETDAILY FOR BLOOD PRESSURE; Therapy: 86CMI6510 to (Josef Santoro)  Requested for: 25XCC4818; Last   Rx:29Jan2016 Ordered   3  Aspirin 81 MG TABS; TAKE 1 TABLET DAILY; Therapy: 23OIQ1751 to (Evaluate:29Huj0756)  Requested for: 55XRM5879; Last   Rx:08Jan2016 Ordered   4  Atorvastatin Calcium 80 MG Oral Tablet; TAKE 1 TABLET DAILY; Therapy: 26WKM5100 to (Evaluate:02Jan2017)  Requested for: 57GHN0168; Last   Rx:08Jan2016 Ordered   5  BD Pen Needle Airam U/F 32G X 4 MM Miscellaneous; Therapy: 95IOJ6440 to Recorded   6  Clotrimazole-Betamethasone 1-0 05 % External Cream; APPLY SPARINGLY TO THE   AFFECTED AREA(S) TWICE DAILY; Therapy: 05Apr2016 to (Last Rx:05Apr2016)  Requested for: 05Apr2016 Ordered   7  Comfort EZ Pen Needles 31G X 6 MM Miscellaneous; Therapy: 58LGJ3774 to Recorded   8  FreeStyle Freedom Lite w/Device Kit; Test blood sugar once daily before a meal;   Therapy: 16Apr2013 to (Evaluate:92Aig9713)  Requested for: 97DYX3802; Last   Rx:16Apr2013 Ordered   9  FreeStyle Lancets Miscellaneous; USE AS DIRECTED; Therapy: 59FTT1422 to (Evaluate:15Mar2014)  Requested for: 43KJO1620;  Last   Rx:14Jan2014 Ordered   10  FreeStyle Lite Device; Therapy: 69SVF2724 to Recorded   11  FreeStyle Test In Vitro Strip; USE 1 STRIP 3 TIMES DAILY; Therapy: 42JSP5984 to (Evaluate:22Jun2014)  Requested for: 35NXB0688; Last    Rx:24Mar2014 Ordered   12  HumaLOG 100 UNIT/ML Subcutaneous Solution; 10 units SQ before breakfast, lunch    and dinner; Therapy: 82KXB6833 to (Evaluate:02Sep2016); Last Rx:05Feb2016 Ordered   13  HumaLOG KwikPen 100 UNIT/ML Subcutaneous Solution Pen-injector; INJECT    SUBCUTANEOUSLY AS DIRECTED; Therapy: 93DDK0068 to (Evaluate:04Jun2016); Last Rx:05Feb2016 Ordered   14  Ketoconazole 2 % External Cream; APPLY A THIN LAYER TO AFFECTED AREA(S) TWICE    DAILY; Therapy: 66Rdm2793 to (Evaluate:54Spr5155)  Requested for: 69Rtk3957; Last    Rx:12Sep2014 Ordered   15  Ketorolac Tromethamine 0 5 % Ophthalmic Solution; Therapy: 05NMT1423 to Recorded   16  Lantus 100 UNIT/ML Subcutaneous Solution; take 34 units at bedtime; Therapy: 45Dxx4225 to (Evaluate:01Jun2016)  Requested for: 71AIF9945; Last    Rx:03Mar2016 Ordered   17  Lisinopril 20 MG Oral Tablet; TOME 1 TABLET BY MOUTH TODOS LOS D? AS FOR    BLOOD PRESSURE  TAKE 1 TABLET BY MOUTH DAILY FOR BLOOD PRESSURE; Therapy: 27MUN2908 to (Hershal Fruits)  Requested for: 32HDX3370; Last    Rx:08Jan2016 Ordered   25  Melatonin 1 MG Oral Tablet; TAKE 1 TABLET Bedtime PRN insomnia; Therapy: 19FOI3185 to (Evaluate:48Bkq2839)  Requested for: 68GLG7494; Last    Rx:08Jan2016 Ordered   19  MetFORMIN HCl - 1000 MG Oral Tablet; TAKE 1 TABLET TWICE DAILY WITH MEALS; Therapy: 51XKL7103 to (Evaluate:31Mar2016)  Requested for: 00JMG0029; Last    Rx:01Mar2016 Ordered   20  Metoprolol Tartrate 25 MG Oral Tablet; TAKE 1 TABLET TWICE DAILY; Therapy: 09IDF5325 to (Evaluate:11Trs0357)  Requested for: 74QSI1225; Last    Rx:08Jan2016 Ordered   21  Naproxen 375 MG Oral Tablet; TAKE 1 TABLET EVERY 12 HOURS AS NEEDED;     Therapy: 50XXU5620 to (Evaluate:17Jan2016)  Requested for: 11XQH5182; Last    Rx:19Oct2015 Ordered   22  Nitrostat 0 4 MG Sublingual Tablet Sublingual; place 1 tablet under the tongue every 5    minutes for up to 3 doses as needed for chest pain  call 911 if pain persists; Therapy: 97Ega4263 to (Nelta [place])  Requested for: 32HVA2910; Last    Rx:02Mar2016 Ordered   23  Omeprazole 20 MG Oral Capsule Delayed Release; TAKE 1 CAPSULE DAILY EVERY    MORNING BEFORE BREAKFAST; Therapy: 47FWI7968 to (Shayla Corpus)  Requested for: 65AAI2362; Last    Rx:08Jan2016 Ordered   24  Pharmacist Choice Alcohol Pad; Therapy: 66LCY0109 to Recorded   25  Polymyxin B-Trimethoprim 11655-2 1 UNIT/ML-% Ophthalmic Solution; Therapy: 27FLY1409 to Recorded   26  PrednisoLONE Acetate 1 % Ophthalmic Suspension; Therapy: 42FJD4094 to Recorded   27  Proventil  (90 Base) MCG/ACT Inhalation Aerosol Solution; INHALE 1 TO 2    PUFFS EVERY 4 TO 6 HOURS AS NEEDED; Therapy: 21LZO0469 to (Evaluate:78Etn9923); Last Rx:02Jan2015 Ordered   28  Tamsulosin HCl - 0 4 MG Oral Capsule; TAKE 1 CAPSULE Bedtime; Therapy: 73HHB5540 to ([id number])  Requested for: 05Apr2016; Last    Rx:05Apr2016 Ordered   29  Tamsulosin HCl - 0 4 MG Oral Capsule; TOME 1 CAPSULE POR VIA ORAL    DIARIAMENTETAKE 1 CAPSULE BY MOUTH ONCE A DAY; Therapy: 22Jez1282 to (Evaluate:58Dpb8086)  Requested for: 01Apr2016; Last    Rx:01Apr2016 Ordered   30  Visine Advanced Relief 0 05-0 1-1-1 % Ophthalmic Solution; use two drops per eye as    needed for irritation and dryness; Therapy: 24HYC6309 to (Last Rx:12Mar2013) Ordered  Medication List Reviewed: The medication list was reviewed and updated today  Allergies  Medication    1  No Known Drug Allergies  Non-Medication    2   Latex    Vitals  Vital Signs [Data Includes: Current Encounter]    Recorded: 29Apr2016 11:04AM   Temperature 98 6 F   Heart Rate 88   Systolic 223   Diastolic 56   Height 5 ft 7 in Weight 178 lb 9 14 oz   BMI Calculated 27 97   BSA Calculated 1 93     Physical Exam    Constitutional   General appearance: No acute distress, well appearing and well nourished  Eyes   Ophthalmoscopic examination: Normal appearing optic disc and posterior segments  Ears, Nose, Mouth, and Throat - External inspection of ears and nose: Normal without deformities or discharge  Nasal mucosa, septum, and turbinates: Normal, no edema or discharge  Oropharynx: Clear, nares are clear, mucous membranes are moist    Neck   Neck and thyroid: Normal, supple, trachea midline, no thyromegaly  no JVD no carotid bruit  Pulmonary   Respiratory effort: No increased work of breathing or signs of respiratory distress  Cardiovascular   Palpation of heart: Normal PMI, no thrills  Auscultation of heart: Normal rate and rhythm, normal S1 and S2, no murmurs  Carotid pulses: Normal, 2+ bilaterally  Peripheral vascular exam: Normal pulses throughout, no tenderness, erythema or swelling  Pedal pulses: Normal, 2+ bilaterally  Examination of extremities for edema and/or varicosities: Abnormal   varicose veins  No palpable cords  No edema  Chest - Chest: Normal    Abdomen   Abdomen: Non-tender and no distention  Liver and spleen: No hepatomegaly or splenomegaly  Musculoskeletal Gait and station: Normal gait  normal gait  Digits and nails: Normal without clubbing or cyanosis  Inspection/palpation of joints, bones, and muscles: Normal, ROM normal     Skin - Skin and subcutaneous tissue: Abnormal  left hand dorsum well healed scar  Neurologic - Cranial nerves: II - XII intact  Psychiatric - Orientation to person, place, and time: Normal  Mood and affect: Normal       Results/Data  Diagnostic Studies Reviewed Cardio:   Lab Review:  HDL 60 Cr 0 8 Hgb 13   Catheterization: VENTRICLES: -- There were no left ventricular global or regional wall   motion  abnormalities   Global left ventricular function was normal  EF calculated by  contrast ventriculography was 55 %  CORONARY VESSELS: -- The coronary circulation is right dominant  -- Left main: Angiography showed minor luminal irregularities  -- LAD: Angiography showed minor luminal irregularities  -- Distal circumflex: There was a 60 % stenosis just after OM2  -- Proximal RCA: Angiography showed minor luminal irregularities  -- Mid RCA: Angiography showed minor luminal irregularities  -- Distal RCA: Angiography showed minor luminal irregularities  -- Right PDA: The distal vessel was supplied by collaterals from septal  branches of LAD  There was a 95 % stenosis in the middle third of the vessel  segment  In a second lesion, there was a 100 % stenosis in the distal third of  the vessel segment  This lesion is a chronic total occlusion  IMPRESSIONS:   small rPDA too small meaningful PCI  RECOMMENDATIONS  medical therapy    Attending Note   I evaluated and discussed the patient with Dr Kapil May and agree with his assessment and plan  Collaborating Physician Note: Collaborating Physician: I interviewed and examined the patient        Future Appointments    Date/Time Provider Specialty Site   2016 10:20 AM [facility]ty Clinic, Endocrinology  [address]   2017 09:30 AM Specialty Clinic, Urology  [address]   06/10/2016 11:15 AM Holley Dumont MD Cardiology [address]     Signatures   Electronically signed by : Adam Carter MD; 2016 11:38AM EST                       (Author)    Electronically signed by : Linda Barr MD; May  3 2016  9:16AM EST                       (Author)

## 2018-01-16 NOTE — PROGRESS NOTES
Assessment  Assessed    1  Benign essential hypertension (401 1) (I10)   2  Cerebrovascular accident (CVA) due to other mechanism (434 91) (I63 8)   3  Hyperlipidemia (272 4) (E78 5)   4  Type 2 diabetes mellitus with hyperglycemia (250 00) (E11 65)   5  Arteriosclerosis of coronary artery (414 00) (I25 10)   6  Former smoker (H70 59) (P66 244)    Plan  Arteriosclerosis of coronary artery    · Metoprolol Tartrate 25 MG Oral Tablet; TAKE 1 TABLET TWICE DAILY   Rx By: Carla Benjamin; Dispense: 30 Days ; #:60 Tablet; Refill: 5; For: Arteriosclerosis of coronary artery; ERIKA = N; Verified Transmission to [facility]; Last Updated By: System, SureScripts; 8/4/2016 12:36:33 PM  Arteriosclerosis of coronary artery, Benign essential hypertension    · Lisinopril 20 MG Oral Tablet; TOME 1 TABLET BY MOUTH TODOS LOS D? AS  FOR BLOOD PRESSURE  TAKE 1 TABLET BY MOUTH DAILY FOR BLOOD PRESSURE   Rx By: Carla Benjamin; Dispense: 30 Days ; #:30 X 30 Tablet Bottle; Refill: 3; For: Arteriosclerosis of coronary artery, Benign essential hypertension; ERIKA = N; Verified Transmission to [facility]; Last Updated By: System, SureScripts; 8/4/2016 12:36:35 PM  Benign essential hypertension    · AmLODIPine Besylate 10 MG Oral Tablet; TOME 1 TABLETA TODOS LOS D? AS  PARA LA PRESION ALTERIAL  TAKE 1 TABLETDAILY FOR BLOOD PRESSURE   Rx By: Carla Benjamin; Dispense: 30 Days ; #:1 X 30 Tablet Bottle; Refill: 3; For: Benign essential hypertension; ERIKA = N; Verified Transmission to [facility]; Last Updated By: System, SureScripts; 8/4/2016 12:36:32 PM  Cerebrovascular accident (CVA) due to other mechanism    · Aspirin 81 MG Oral Tablet Chewable; CHEW AND SWALLOW 1 TABLET DAILY   Rx By: Carla Benjamin; Dispense: 30 Days ; #:30 Tablet Chewable; Refill: 11;  For: Cerebrovascular accident (CVA) due to other mechanism; ERIKA = N; Verified Transmission to [facility]; Last Updated By: System, SureScripts; 8/4/2016 12:38:10 PM  Hyperlipidemia    · Atorvastatin Calcium 80 MG Oral Tablet; TAKE 1 TABLET DAILY   Rx By: Jong Hoang; Dispense: 90 Days ; #:90 Tablet; Refill: 3; For: Hyperlipidemia; ERIKA = N; Verified Transmission to [facility]; Last Updated By: System, SureScripts; 8/4/2016 12:36:34 PM  Type 2 diabetes mellitus with hyperglycemia    · Aspirin 81 MG TABS   Rx By: Garfield Hodgson; Dispense: 30 Days ; #:30 Tablet; Refill: 5; For: Type 2 diabetes mellitus with hyperglycemia; ERIKA = N; Sent To: [facility]; Last Updated By: Jong Hoang; 8/4/2016 12:31:01 PM   · MetFORMIN HCl - 1000 MG Oral Tablet; TAKE 1 TABLET TWICE DAILY WITH  MEALS   Rx By: Jong Hoang; Dispense: 30 Days ; #:60 Tablet; Refill: 0; For: Type 2 diabetes mellitus with hyperglycemia; ERIKA = N; Verified Transmission to [facility]; Last Updated By: System, SureScripts; 8/4/2016 12:37:31 PM    Discussion/Summary  Cardiology Discussion Summary Free Text Note Form  Luke:   Assessment:   1  Recent admission with atypical chest pain  2  Stable CAD: 95 % mid RCA, distal 100%  RCA, 60 % CX  preserved EF - medical therapy  2  Hypertension  3  Hyperlipidemia  4  Hx of CVA 2015- [facility]  5  DM2- A1c: 9 6  6  Former smoker    Plan:   Randy Zapata is asymptomatic from his coronary disease and has a good exercise tolerance without any exertional symptoms  He has had no recurrent symptoms since discharge and had a negative workup in the hospital  No further diagnostic workup is indicated at this time  We will continue with medical management with Aspirin 81mg/day, metoprolol tartrate 25mg BID, atorvastatin 80mg/day, and sl nitro  His blood pressure is well controlled with metoprolol, amlodipine, and lisinopril, HIs lipids are also reasonably well controlled  His medications were refilled and will set up follow up appointment in 6 months or sooner if he develops any concerns  History of Present Illness  Cardiology HPI Free Text Note Form Andrea Clarity: 68 y/o M with hx of stable CAD s/p MI in 2000 in [place] and abnormal stress echo followed by cath in 2013 that revealed  of the distal RCA with 95% prox RCA disease and 60% LCX lesion currently medically managed presents for a post hospitalization follow up  He was admitted from 7/24-7/25 with atypical chest pain  He states that it was throbbing in nature that occurred when he was cooking and lasted for 3-4 hrs  He was worked up with an ekg which revealed nsr with LAD and LVH  Serial troponins were negative and he was subsequently discharged  He denies any symptoms since discharge  He is very active helping his neighbors with gardening related chores  He states that he walks up 8 flights of stairs everyday with no decrease in exercise tolerance  He states he is compliant with all of his medications and has not had to take any sl nitor  He denies LE edema, weight gain, demarco, or any other concerns at this time    PMHx  -7/2016- Hospitalized with atypical cp; workup with ekg and serial trops was negative for ischemia  -4/2016: hospitalized with concerns of atypical chest burning and peripheral neuropathy  - 2015- ischemic cva at [facility]  -12/2013: exertional dyspnea prompted an exercise stress echo which was suggestive of stress induced ischemia of the inferior wall which triggered a cath which revealed  of the distal RCA with 95% prox RCA disease and 60% LCX lesion-medical management was recommended  - Former smoker with a 20 pack year hx of smoking however quit 20 years ago  Hospital Based Practices Required Assessment:   Pain Assessment   the patient states they do not have pain  (on a scale of 0 to 10, the patient rates the pain at 0 )    Prefered Language is  Polish  Primary Language is  Polish        Review of Systems  Cardiology Male ROS:     Cardiac: no chest pain, no rhythm problems, no signs of swelling and no palpitations present  General: no lack of energy/fatigue  Respiratory: no shortness of breath  Neurological: No complaints of numbness, tingling, dizziness, weakness, seizures, headaches, syncope or fainting, AM fatigue, daytime sleepiness, no witnessed apnea episodes  Active Problems  Problems    1  Abdominal pain (789 00) (R10 9)   2  Acid indigestion (536 8) (K30)   3  Arteriosclerosis of coronary artery (414 00) (I25 10)   4  Asthma (493 90) (J45 909)   5  Benign essential hypertension (401 1) (I10)   6  Cataract (366 9) (H26 9)   7  Cerebrovascular accident (CVA) due to other mechanism (434 91) (I63 8)   8  Cervicalgia (723 1) (M54 2)   9  Diverticulosis (562 10) (K57 90)   10  Enlarged prostate without lower urinary tract symptoms (luts) (600 00) (N40 0)   11  Exertional angina (413 9) (I20 8)   12  Fungal skin infection (111 9) (B36 9)   13  Gastroesophageal reflux disease with esophagitis (530 11) (K21 0)   14  Hyperlipidemia (272 4) (E78 5)   15  Insomnia (780 52) (G47 00)   16  Limb pain (729 5) (M79 609)   17  Lower abdominal pain (789 09) (R10 30)   18  Lower back pain (724 2) (M54 5)   19  Pain in joint of left shoulder (719 41) (M25 512)   20  Thoracic back pain (724 1) (M54 6)   21  Type 2 diabetes mellitus with hyperglycemia (250 00) (E11 65)   22  Type 2 diabetes mellitus with other circulatory complications (604 17) (S90 57)   23  Varicose Veins Of Lower Extremities (454 9)   24  Visit for pre-operative examination (V72 84) (J52 268)    Past Medical History  Problems    1  History of allergic rhinitis (V12 69) (Z87 09)   2  History of pharyngitis (V12 69) (Z87 09)   3  History of Need for influenza vaccination (V04 81) (Z23)   4  History of Need for Tdap vaccination (V06 1) (Z23)   5  History of Tinea corporis (110 5) (B35 4)    Surgical History  Problems    1  History of Back Surgery   2  History of Cataract Surgery   3  Hernia Repair   4   History of Transurethral Resection Of Prostate (TURP)    Family History  Father    1  Family history of Diabetes Mellitus (V18 0)  Sister    2  Family history of kidney disease (V18 69) (Z84 1)    Social History  Problems    · Denied: History of Alcohol use   · Denied: History of Drug Use   · Former smoker (V15 82) (N63 122)   · Uses Safety Equipment - Seatbelts    Current Meds   1  Acetaminophen 500 MG Oral Tablet; TAKE 1 TABLET EVERY 4 TO 6 HOURS AS   NEEDED; Therapy: 22Apr2015 to (Scar James)  Requested for: 22Apr2015; Last   Rx:22Apr2015 Ordered   2  AmLODIPine Besylate 10 MG Oral Tablet; TOME 1 TABLETA TODOS LOS D? AS PARA LA   PRESION ALTERIAL  TAKE 1 TABLETDAILY FOR BLOOD PRESSURE; Therapy: 83PJY2935 to (Galen Agarwal)  Requested for: 95IMD9414; Last   Rx:29Jan2016 Ordered   3  Aspirin 81 MG TABS; TAKE 1 TABLET DAILY; Therapy: 37VPM5668 to (Evaluate:38Fge5806)  Requested for: 92BMW2547; Last   Rx:08Jan2016 Ordered   4  Atorvastatin Calcium 80 MG Oral Tablet; TAKE 1 TABLET DAILY; Therapy: 60FNS1143 to (Evaluate:02Jan2017)  Requested for: 30AOP9108; Last   Rx:08Jan2016 Ordered   5  BD Pen Needle Airam U/F 32G X 4 MM Miscellaneous; Therapy: 24BHE5295 to Recorded   6  Clotrimazole-Betamethasone 1-0 05 % External Cream; APPLY SPARINGLY TO THE   AFFECTED AREA(S) TWICE DAILY; Therapy: 05Apr2016 to (Last Rx:05Apr2016)  Requested for: 05Apr2016 Ordered   7  Comfort EZ Pen Needles 31G X 6 MM Miscellaneous; Therapy: 79UNY5463 to Recorded   8  FreeStyle Freedom Lite w/Device Kit; Test blood sugar once daily before a meal;   Therapy: 10Bda0765 to (Evaluate:16Stv3642)  Requested for: 06BHT4567; Last   Rx:16Apr2013 Ordered   9  FreeStyle Lancets Miscellaneous; USE AS DIRECTED; Therapy: 24DPA7140 to (Evaluate:15Mar2014)  Requested for: 37WXB0580; Last   Rx:14Jan2014 Ordered   10  FreeStyle Lite Device; Therapy: 27AUG6622 to Recorded   11  FreeStyle Test In Vitro Strip; USE 1 STRIP 3 TIMES DAILY;     Therapy: 50DFL9173 to (Evaluate:22Jun2014) Requested for: 79TBH3775; Last    Rx:24Mar2014 Ordered   12  HumaLOG 100 UNIT/ML Subcutaneous Solution; 10 units SQ before breakfast, lunch    and dinner; Therapy: 82KVX9920 to (Evaluate:02Sep2016); Last Rx:05Feb2016 Ordered   13  HumaLOG KwikPen 100 UNIT/ML Subcutaneous Solution Pen-injector; INJECT    SUBCUTANEOUSLY AS DIRECTED; Therapy: 46NOE3135 to (Evaluate:04Jun2016); Last Rx:05Feb2016 Ordered   14  Ketoconazole 2 % External Cream; APPLY A THIN LAYER TO AFFECTED AREA(S) TWICE    DAILY; Therapy: 22Xet3573 to (Evaluate:38Wvy1777)  Requested for: 38Pzs4817; Last    Rx:12Sep2014 Ordered   15  Ketorolac Tromethamine 0 5 % Ophthalmic Solution; Therapy: 09OCT7128 to Recorded   16  Lantus 100 UNIT/ML Subcutaneous Solution; take 34 units at bedtime; Therapy: 65Hyc1825 to (Evaluate:01Jun2016)  Requested for: 51RPQ7766; Last    Rx:03Mar2016 Ordered   17  Lisinopril 20 MG Oral Tablet; TOME 1 TABLET BY MOUTH TODOS LOS D? AS FOR    BLOOD PRESSURE  TAKE 1 TABLET BY MOUTH DAILY FOR BLOOD PRESSURE; Therapy: 20UKO1950 to (Catalina Calix)  Requested for: 53YLO1266; Last    Rx:08Jan2016 Ordered   25  Melatonin 1 MG Oral Tablet; TAKE 1 TABLET Bedtime PRN insomnia; Therapy: 89GEN9663 to (Evaluate:23Czd6372)  Requested for: 22TUT6326; Last    Rx:08Jan2016 Ordered   19  MetFORMIN HCl - 1000 MG Oral Tablet; TAKE 1 TABLET TWICE DAILY WITH MEALS; Therapy: 98JDX9061 to (Evaluate:31Mar2016)  Requested for: 87LOG9092; Last    Rx:01Mar2016 Ordered   20  Metoprolol Tartrate 25 MG Oral Tablet; TAKE 1 TABLET TWICE DAILY; Therapy: 61EWT1032 to (Evaluate:77Fsw2495)  Requested for: 19CPD9730; Last    Rx:08Jan2016 Ordered   21  Naproxen 375 MG Oral Tablet; TAKE 1 TABLET EVERY 12 HOURS AS NEEDED; Therapy: 98SVV5804 to (Evaluate:17Jan2016)  Requested for: 03BAI8606; Last    Rx:03Lxq0788 Ordered   22   Nitrostat 0 4 MG Sublingual Tablet Sublingual; place 1 tablet under the tongue every 5    minutes for up to 3 doses as needed for chest pain  call 911 if pain persists; Therapy: 68Odt6923 to (Harpreet Jernigan)  Requested for: 03JLZ7496; Last    Rx:02Mar2016 Ordered   23  Omeprazole 20 MG Oral Capsule Delayed Release; TAKE 1 CAPSULE TWICE DAILY; Therapy: 76WQL5248 to (Evaluate:26Oct2016)  Requested for: 29Apr2016; Last    Rx:29Apr2016 Ordered   24  Pharmacist Choice Alcohol Pad; Therapy: 44QYN1308 to Recorded   25  Polymyxin B-Trimethoprim 64734-4 1 UNIT/ML-% Ophthalmic Solution; Therapy: 75VQM0521 to Recorded   26  PrednisoLONE Acetate 1 % Ophthalmic Suspension; Therapy: 15GBK1810 to Recorded   27  Proventil  (90 Base) MCG/ACT Inhalation Aerosol Solution; INHALE 1 TO 2    PUFFS EVERY 4 TO 6 HOURS AS NEEDED; Therapy: 94QTM4247 to (Evaluate:01Feb2015); Last Rx:02Jan2015 Ordered   28  Tamsulosin HCl - 0 4 MG Oral Capsule; TAKE 1 CAPSULE Bedtime; Therapy: 77CMZ8315 to ([id number] [id number])  Requested for: 05Apr2016; Last    Rx:05Apr2016 Ordered   29  Tamsulosin HCl - 0 4 MG Oral Capsule; TOME 1 CAPSULE POR VIA ORAL    DIARIAMENTETAKE 1 CAPSULE BY MOUTH ONCE A DAY; Therapy: [address] to (Evaluate:78Dom0924)  Requested for: 01Apr2016; Last    Rx:01Apr2016 Ordered   30  Visine Advanced Relief 0 05-0 1-1-1 % Ophthalmic Solution; use two drops per eye as    needed for irritation and dryness; Therapy: 35OVV3167 to (Last Rx:12Mar2013) Ordered    Allergies  Medication    1  No Known Drug Allergies  Non-Medication    2  Latex    Vitals  Vital Signs    Recorded: 96NCQ8222 64:71YH   Systolic 991   Diastolic 56   Heart Rate 84   Temperature 98 3 F   Height 5 ft 7 in   Weight 182 lb 1 58 oz   BMI Calculated 28 52   BSA Calculated 1 94     Physical Exam    Constitutional   General appearance: No acute distress, well appearing and well nourished  Pulmonary   Respiratory effort: No increased work of breathing or signs of respiratory distress      Auscultation of lungs: Clear to auscultation, no rales, no rhonchi, no wheezing, good air movement  Cardiovascular   Palpation of heart: Normal PMI, no thrills  Auscultation of heart: Normal rate and rhythm, normal S1 and S2, no murmurs  Examination of extremities for edema and/or varicosities: Normal     Chest - Chest: Normal    Abdomen   Abdomen: Non-tender and no distention  Musculoskeletal Gait and station: Normal gait  Psychiatric - Orientation to person, place, and time: Normal  Mood and affect: Normal       Results/Data  ECG Report:   Rhythm and rate:  normal sinus rhythm     QRS: left bundle branch block and left ventricular hypertrophy      Future Appointments    Date/Time Provider Specialty Site   02/02/2017 11:15 AM Kate Fernandez MD Internal Medicine [facility] SPECIAL[facility]   04/04/2017 09:30 AM Specialty Clinic, Urology  [address]     Signatures   Electronically signed by : Bridger Nuñez MD; Aug  5 2016 10:28AM EST                       (Author)    Electronically signed by : RONI Banda ; Aug  9 2016  2:36PM EST                       (Author)

## 2018-02-05 ENCOUNTER — APPOINTMENT (EMERGENCY)
Dept: RADIOLOGY | Facility: HOSPITAL | Age: 73
End: 2018-02-05
Payer: COMMERCIAL

## 2018-02-05 ENCOUNTER — HOSPITAL ENCOUNTER (OUTPATIENT)
Facility: HOSPITAL | Age: 73
Setting detail: OBSERVATION
Discharge: HOME | End: 2018-02-06
Attending: EMERGENCY MEDICINE | Admitting: HOSPITALIST
Payer: COMMERCIAL

## 2018-02-05 DIAGNOSIS — R07.9 CHEST PAIN: Primary | ICD-10-CM

## 2018-02-05 DIAGNOSIS — R07.89 OTHER CHEST PAIN: ICD-10-CM

## 2018-02-05 LAB
ANION GAP SERPL CALCULATED.3IONS-SCNC: 6 MMOL/L (ref 4–13)
BASOPHILS # BLD AUTO: 0.07 THOUSANDS/ΜL (ref 0–0.1)
BASOPHILS NFR BLD AUTO: 1 % (ref 0–1)
BUN SERPL-MCNC: 10 MG/DL (ref 5–25)
CALCIUM SERPL-MCNC: 8.9 MG/DL (ref 8.3–10.1)
CHLORIDE SERPL-SCNC: 101 MMOL/L (ref 100–108)
CO2 SERPL-SCNC: 29 MMOL/L (ref 21–32)
CREAT SERPL-MCNC: 0.94 MG/DL (ref 0.6–1.3)
EOSINOPHIL # BLD AUTO: 0.23 THOUSAND/ΜL (ref 0–0.61)
EOSINOPHIL NFR BLD AUTO: 2 % (ref 0–6)
ERYTHROCYTE [DISTWIDTH] IN BLOOD BY AUTOMATED COUNT: 14.4 % (ref 11.6–15.1)
GFR SERPL CREATININE-BSD FRML MDRD: 80 ML/MIN/1.73SQ M
GLUCOSE SERPL-MCNC: 131 MG/DL (ref 65–140)
HCT VFR BLD AUTO: 37.8 % (ref 36.5–49.3)
HGB BLD-MCNC: 12.8 G/DL (ref 12–17)
LYMPHOCYTES # BLD AUTO: 3.13 THOUSANDS/ΜL (ref 0.6–4.47)
LYMPHOCYTES NFR BLD AUTO: 29 % (ref 14–44)
MCH RBC QN AUTO: 29.9 PG (ref 26.8–34.3)
MCHC RBC AUTO-ENTMCNC: 33.9 G/DL (ref 31.4–37.4)
MCV RBC AUTO: 88 FL (ref 82–98)
MONOCYTES # BLD AUTO: 0.91 THOUSAND/ΜL (ref 0.17–1.22)
MONOCYTES NFR BLD AUTO: 8 % (ref 4–12)
NEUTROPHILS # BLD AUTO: 6.49 THOUSANDS/ΜL (ref 1.85–7.62)
NEUTS SEG NFR BLD AUTO: 60 % (ref 43–75)
NRBC BLD AUTO-RTO: 0 /100 WBCS
PLATELET # BLD AUTO: 233 THOUSANDS/UL (ref 149–390)
PMV BLD AUTO: 10.2 FL (ref 8.9–12.7)
POTASSIUM SERPL-SCNC: 3.7 MMOL/L (ref 3.5–5.3)
RBC # BLD AUTO: 4.28 MILLION/UL (ref 3.88–5.62)
SODIUM SERPL-SCNC: 136 MMOL/L (ref 136–145)
TROPONIN I SERPL-MCNC: <0.02 NG/ML
WBC # BLD AUTO: 10.85 THOUSAND/UL (ref 4.31–10.16)

## 2018-02-05 PROCEDURE — 93005 ELECTROCARDIOGRAM TRACING: CPT | Performed by: EMERGENCY MEDICINE

## 2018-02-05 PROCEDURE — 84484 ASSAY OF TROPONIN QUANT: CPT | Performed by: EMERGENCY MEDICINE

## 2018-02-05 PROCEDURE — 85025 COMPLETE CBC W/AUTO DIFF WBC: CPT | Performed by: EMERGENCY MEDICINE

## 2018-02-05 PROCEDURE — 80048 BASIC METABOLIC PNL TOTAL CA: CPT | Performed by: EMERGENCY MEDICINE

## 2018-02-05 PROCEDURE — 71046 X-RAY EXAM CHEST 2 VIEWS: CPT

## 2018-02-05 PROCEDURE — 36415 COLL VENOUS BLD VENIPUNCTURE: CPT | Performed by: EMERGENCY MEDICINE

## 2018-02-05 RX ORDER — 0.9 % SODIUM CHLORIDE 0.9 %
3 VIAL (ML) INJECTION AS NEEDED
Status: DISCONTINUED | OUTPATIENT
Start: 2018-02-05 | End: 2018-02-06 | Stop reason: HOSPADM

## 2018-02-05 RX ORDER — ASPIRIN 81 MG/1
324 TABLET, CHEWABLE ORAL ONCE
Status: COMPLETED | OUTPATIENT
Start: 2018-02-05 | End: 2018-02-05

## 2018-02-05 RX ADMIN — ASPIRIN 81 MG 324 MG: 81 TABLET ORAL at 23:16

## 2018-02-06 ENCOUNTER — APPOINTMENT (OUTPATIENT)
Dept: NON INVASIVE DIAGNOSTICS | Facility: HOSPITAL | Age: 73
End: 2018-02-06
Payer: COMMERCIAL

## 2018-02-06 ENCOUNTER — APPOINTMENT (OUTPATIENT)
Dept: RADIOLOGY | Facility: HOSPITAL | Age: 73
End: 2018-02-06
Payer: COMMERCIAL

## 2018-02-06 VITALS
OXYGEN SATURATION: 99 % | RESPIRATION RATE: 18 BRPM | BODY MASS INDEX: 28.25 KG/M2 | WEIGHT: 180 LBS | TEMPERATURE: 98.4 F | SYSTOLIC BLOOD PRESSURE: 147 MMHG | HEART RATE: 64 BPM | HEIGHT: 67 IN | DIASTOLIC BLOOD PRESSURE: 70 MMHG

## 2018-02-06 PROBLEM — E13.9 DIABETES 1.5, MANAGED AS TYPE 2 (HCC): Status: ACTIVE | Noted: 2018-02-06

## 2018-02-06 PROBLEM — E78.49 OTHER HYPERLIPIDEMIA: Status: ACTIVE | Noted: 2018-02-06

## 2018-02-06 PROBLEM — E08.21 DIABETES DUE TO UNDERLYING CONDITION W DIABETIC NEPHROPATHY (HCC): Status: ACTIVE | Noted: 2018-02-06

## 2018-02-06 PROBLEM — E13.9 DIABETES 1.5, MANAGED AS TYPE 2 (HCC): Chronic | Status: ACTIVE | Noted: 2018-02-06

## 2018-02-06 PROBLEM — E08.21 DIABETES DUE TO UNDERLYING CONDITION W DIABETIC NEPHROPATHY (HCC): Chronic | Status: ACTIVE | Noted: 2018-02-06

## 2018-02-06 PROBLEM — E78.49 OTHER HYPERLIPIDEMIA: Chronic | Status: ACTIVE | Noted: 2018-02-06

## 2018-02-06 LAB
ANION GAP SERPL CALCULATED.3IONS-SCNC: 7 MMOL/L (ref 4–13)
ATRIAL RATE: 74 BPM
BUN SERPL-MCNC: 9 MG/DL (ref 5–25)
CALCIUM SERPL-MCNC: 8.7 MG/DL (ref 8.3–10.1)
CHLORIDE SERPL-SCNC: 106 MMOL/L (ref 100–108)
CHOLEST SERPL-MCNC: 143 MG/DL (ref 50–200)
CO2 SERPL-SCNC: 27 MMOL/L (ref 21–32)
CREAT SERPL-MCNC: 0.83 MG/DL (ref 0.6–1.3)
EST. AVERAGE GLUCOSE BLD GHB EST-MCNC: 200 MG/DL
GFR SERPL CREATININE-BSD FRML MDRD: 87 ML/MIN/1.73SQ M
GLUCOSE P FAST SERPL-MCNC: 121 MG/DL (ref 65–99)
GLUCOSE SERPL-MCNC: 121 MG/DL (ref 65–140)
GLUCOSE SERPL-MCNC: 154 MG/DL (ref 65–140)
GLUCOSE SERPL-MCNC: 161 MG/DL (ref 65–140)
GLUCOSE SERPL-MCNC: 167 MG/DL (ref 65–140)
HBA1C MFR BLD: 8.6 % (ref 4.2–6.3)
HDLC SERPL-MCNC: 62 MG/DL (ref 40–60)
LDLC SERPL CALC-MCNC: 66 MG/DL (ref 0–100)
P AXIS: 62 DEGREES
PLATELET # BLD AUTO: 230 THOUSANDS/UL (ref 149–390)
PMV BLD AUTO: 10.4 FL (ref 8.9–12.7)
POTASSIUM SERPL-SCNC: 3.8 MMOL/L (ref 3.5–5.3)
PR INTERVAL: 168 MS
QRS AXIS: -35 DEGREES
QRSD INTERVAL: 124 MS
QT INTERVAL: 368 MS
QTC INTERVAL: 408 MS
SODIUM SERPL-SCNC: 140 MMOL/L (ref 136–145)
T WAVE AXIS: 61 DEGREES
TRIGL SERPL-MCNC: 73 MG/DL
TROPONIN I SERPL-MCNC: <0.02 NG/ML
VENTRICULAR RATE: 74 BPM

## 2018-02-06 PROCEDURE — 93306 TTE W/DOPPLER COMPLETE: CPT

## 2018-02-06 PROCEDURE — 93017 CV STRESS TEST TRACING ONLY: CPT

## 2018-02-06 PROCEDURE — 78452 HT MUSCLE IMAGE SPECT MULT: CPT | Performed by: INTERNAL MEDICINE

## 2018-02-06 PROCEDURE — A9502 TC99M TETROFOSMIN: HCPCS

## 2018-02-06 PROCEDURE — 83036 HEMOGLOBIN GLYCOSYLATED A1C: CPT | Performed by: HOSPITALIST

## 2018-02-06 PROCEDURE — 80061 LIPID PANEL: CPT | Performed by: HOSPITALIST

## 2018-02-06 PROCEDURE — 99217 PR OBSERVATION CARE DISCHARGE MANAGEMENT: CPT | Performed by: INTERNAL MEDICINE

## 2018-02-06 PROCEDURE — 78452 HT MUSCLE IMAGE SPECT MULT: CPT

## 2018-02-06 PROCEDURE — 93306 TTE W/DOPPLER COMPLETE: CPT | Performed by: INTERNAL MEDICINE

## 2018-02-06 PROCEDURE — 93018 CV STRESS TEST I&R ONLY: CPT | Performed by: INTERNAL MEDICINE

## 2018-02-06 PROCEDURE — 80048 BASIC METABOLIC PNL TOTAL CA: CPT | Performed by: HOSPITALIST

## 2018-02-06 PROCEDURE — 93016 CV STRESS TEST SUPVJ ONLY: CPT | Performed by: INTERNAL MEDICINE

## 2018-02-06 PROCEDURE — 82948 REAGENT STRIP/BLOOD GLUCOSE: CPT

## 2018-02-06 PROCEDURE — 93010 ELECTROCARDIOGRAM REPORT: CPT | Performed by: INTERNAL MEDICINE

## 2018-02-06 PROCEDURE — 84484 ASSAY OF TROPONIN QUANT: CPT | Performed by: HOSPITALIST

## 2018-02-06 PROCEDURE — 85049 AUTOMATED PLATELET COUNT: CPT | Performed by: HOSPITALIST

## 2018-02-06 PROCEDURE — 99285 EMERGENCY DEPT VISIT HI MDM: CPT

## 2018-02-06 PROCEDURE — 99220 PR INITIAL OBSERVATION CARE/DAY 70 MINUTES: CPT | Performed by: HOSPITALIST

## 2018-02-06 RX ORDER — SENNOSIDES 8.6 MG
1 TABLET ORAL DAILY
Status: DISCONTINUED | OUTPATIENT
Start: 2018-02-06 | End: 2018-02-06 | Stop reason: HOSPADM

## 2018-02-06 RX ORDER — ONDANSETRON 2 MG/ML
4 INJECTION INTRAMUSCULAR; INTRAVENOUS EVERY 6 HOURS PRN
Status: DISCONTINUED | OUTPATIENT
Start: 2018-02-06 | End: 2018-02-06 | Stop reason: HOSPADM

## 2018-02-06 RX ORDER — LISINOPRIL 20 MG/1
20 TABLET ORAL DAILY
Status: DISCONTINUED | OUTPATIENT
Start: 2018-02-06 | End: 2018-02-06 | Stop reason: HOSPADM

## 2018-02-06 RX ORDER — NITROGLYCERIN 0.3 MG/1
0.3 TABLET SUBLINGUAL
Status: DISCONTINUED | OUTPATIENT
Start: 2018-02-06 | End: 2018-02-06 | Stop reason: HOSPADM

## 2018-02-06 RX ORDER — DOCUSATE SODIUM 100 MG/1
100 CAPSULE, LIQUID FILLED ORAL 2 TIMES DAILY
Status: DISCONTINUED | OUTPATIENT
Start: 2018-02-06 | End: 2018-02-06 | Stop reason: HOSPADM

## 2018-02-06 RX ORDER — LABETALOL HYDROCHLORIDE 5 MG/ML
10 INJECTION, SOLUTION INTRAVENOUS EVERY 4 HOURS PRN
Status: DISCONTINUED | OUTPATIENT
Start: 2018-02-06 | End: 2018-02-06 | Stop reason: HOSPADM

## 2018-02-06 RX ORDER — ACETAMINOPHEN 325 MG/1
650 TABLET ORAL EVERY 6 HOURS PRN
Status: DISCONTINUED | OUTPATIENT
Start: 2018-02-06 | End: 2018-02-06 | Stop reason: HOSPADM

## 2018-02-06 RX ORDER — ASPIRIN 81 MG/1
81 TABLET ORAL DAILY
Status: DISCONTINUED | OUTPATIENT
Start: 2018-02-06 | End: 2018-02-06 | Stop reason: HOSPADM

## 2018-02-06 RX ORDER — AMLODIPINE BESYLATE 10 MG/1
10 TABLET ORAL DAILY
Status: DISCONTINUED | OUTPATIENT
Start: 2018-02-06 | End: 2018-02-06 | Stop reason: HOSPADM

## 2018-02-06 RX ORDER — ATORVASTATIN CALCIUM 80 MG/1
80 TABLET, FILM COATED ORAL
Status: DISCONTINUED | OUTPATIENT
Start: 2018-02-06 | End: 2018-02-06 | Stop reason: HOSPADM

## 2018-02-06 RX ORDER — ACETAMINOPHEN 325 MG/1
650 TABLET ORAL EVERY 6 HOURS PRN
Qty: 30 TABLET | Refills: 0
Start: 2018-02-06

## 2018-02-06 RX ORDER — TAMSULOSIN HYDROCHLORIDE 0.4 MG/1
0.4 CAPSULE ORAL DAILY
Status: DISCONTINUED | OUTPATIENT
Start: 2018-02-06 | End: 2018-02-06 | Stop reason: HOSPADM

## 2018-02-06 RX ORDER — SODIUM CHLORIDE 9 MG/ML
75 INJECTION, SOLUTION INTRAVENOUS CONTINUOUS
Status: DISPENSED | OUTPATIENT
Start: 2018-02-06 | End: 2018-02-06

## 2018-02-06 RX ORDER — POLYETHYLENE GLYCOL 3350 17 G/17G
17 POWDER, FOR SOLUTION ORAL DAILY
Status: DISCONTINUED | OUTPATIENT
Start: 2018-02-06 | End: 2018-02-06 | Stop reason: HOSPADM

## 2018-02-06 RX ADMIN — AMLODIPINE BESYLATE 10 MG: 10 TABLET ORAL at 08:35

## 2018-02-06 RX ADMIN — INSULIN LISPRO 1 UNITS: 100 INJECTION, SOLUTION INTRAVENOUS; SUBCUTANEOUS at 11:52

## 2018-02-06 RX ADMIN — DOCUSATE SODIUM 100 MG: 100 CAPSULE, LIQUID FILLED ORAL at 17:39

## 2018-02-06 RX ADMIN — ATORVASTATIN CALCIUM 80 MG: 80 TABLET, FILM COATED ORAL at 17:39

## 2018-02-06 RX ADMIN — TAMSULOSIN HYDROCHLORIDE 0.4 MG: 0.4 CAPSULE ORAL at 08:35

## 2018-02-06 RX ADMIN — ASPIRIN 81 MG: 81 TABLET, COATED ORAL at 08:29

## 2018-02-06 RX ADMIN — SODIUM CHLORIDE 75 ML/HR: 0.9 INJECTION, SOLUTION INTRAVENOUS at 02:30

## 2018-02-06 RX ADMIN — ENOXAPARIN SODIUM 40 MG: 40 INJECTION SUBCUTANEOUS at 08:31

## 2018-02-06 RX ADMIN — METOPROLOL TARTRATE 25 MG: 25 TABLET ORAL at 08:36

## 2018-02-06 RX ADMIN — POLYETHYLENE GLYCOL 3350 17 G: 17 POWDER, FOR SOLUTION ORAL at 08:26

## 2018-02-06 RX ADMIN — DOCUSATE SODIUM 100 MG: 100 CAPSULE, LIQUID FILLED ORAL at 08:38

## 2018-02-06 RX ADMIN — LISINOPRIL 20 MG: 20 TABLET ORAL at 08:37

## 2018-02-06 RX ADMIN — INSULIN LISPRO 1 UNITS: 100 INJECTION, SOLUTION INTRAVENOUS; SUBCUTANEOUS at 08:26

## 2018-02-06 RX ADMIN — SENNOSIDES 8.6 MG: 8.6 TABLET, FILM COATED ORAL at 08:29

## 2018-02-06 NOTE — CASE MANAGEMENT
Thank you,  [facility] in the [facility] by Harish Garibay for 2017  Network Utilization Review Department  Phone: [phone]; Fax [phone]  ATTENTION: The Network Utilization Review Department is now centralized for our 7 Facilities  Make a note that we have a new phone and fax numbers for our Department  Please call with any questions or concerns to [phone] and carefully follow the prompts so that you are directed to the right person  All voicemails are confidential  Fax any determinations, approvals, denials, and requests for initial or continue stay review clinical to [phone]  Due to HIGH CALL volume, it would be easier if you could please send faxed requests to expedite your requests and in part, help us provide discharge notifications faster  Initial Clinical Review    Admission: Date/Time/Statement: Observation 2/6/18 @ 0034 - Care initiated on 2/5/18 @ 2309    Orders Placed This Encounter   Procedures    Place in Observation     Standing Status:   Standing     Number of Occurrences:   1     Order Specific Question:   Admitting Physician     Answer:   Celio Monterroso     Order Specific Question:   Level of Care     Answer:   Med Surg [16]     ED: Date/Time/Mode of Arrival:   ED Arrival Information     Expected Arrival Acuity Means of Arrival Escorted By Service Admission Type    - 2/5/2018 22:27 Urgent Walk-In Family Member General Medicine Urgent    Arrival Complaint    chest pain        Chief Complaint:   Chief Complaint   Patient presents with    Chest Pain     Pt c/o CP for 2 hours  History of Illness: Sravan Manjarrez is a 67 y o  Yi-speaking male  with history of hypertension diabetes hyperlipidemia who presented to the emergency room for evaluation of atypical chest pain started 2 hours prior admission    Patient reports acute onset of left-sided nonexertional aching constant CP, not associated with dizziness, diaphoresis or shortness of breath  No alleviating or aggravating factors ( SL nitro did not relieve pain)  2 troponins negative ,EKG no acute changes, chest x-ray normal   Patient was given extra dose of aspirin in the Er  Given multiple risk factors and VILMA score 3 patient admitted on observation basis for inpatient stress test and echocardiogram      ED Vital Signs:   ED Triage Vitals [02/05/18 2240]   Temperature Pulse Respirations Blood Pressure SpO2   97 7 °F (36 5 °C) 78 18 153/71 99 %      Temp Source Heart Rate Source Patient Position - Orthostatic VS BP Location FiO2 (%)   Tympanic Monitor Lying Left arm --      Pain Score       7        Wt Readings from Last 1 Encounters:   02/05/18 81 6 kg (180 lb)     Vital Signs (abnormal):   02/06/18 0209  98 7 °F (37 1 °C)  72  18   180/75  100 %  None (Room air)  Lying     Abnormal Labs:  Trop WNL   POC glucose 167  Fasting glucose 2/6 - 121  WBC 10 85  A1C 8 6    Diagnostic Test Results:     2/5 EKG - NSR   2/6 NM Myocardial perfusion spect - pending  2/6 Cardiac Eho complete    ED Treatment:   Medication Administration from 02/05/2018 2227 to 02/06/2018 0201    Date/Time Order Dose Route Action   02/05/2018 2316 aspirin chewable tablet 324 mg 324 mg Oral Given        Past Medical/Surgical History:    Active Ambulatory Problems     Diagnosis Date Noted    Essential hypertension with goal blood pressure less than 140/90 04/23/2016    Diabetic peripheral neuropathy ([facility] [address] ) 04/23/2016    Other chest pain 07/25/2016    CAD (coronary artery disease) 07/25/2016    GERD (gastroesophageal reflux disease) 07/25/2016     Resolved Ambulatory Problems     Diagnosis Date Noted    Atypical chest pain 04/23/2016     Past Medical History:   Diagnosis Date    Asthma     CAD (coronary artery disease) 7/25/2016    Cardiac disease     Diabetes mellitus ([facility] [address] )     Hypertension      Admitting Diagnosis: Chest pain [R07 9]    Age/Sex: 67 y o  male    Assessment/Plan:   * Other chest pain   Assessment & Plan     Patient presented with nonexertional, reproducible chest pain not relieved with nitro   pain atypical   given multiple risk factors and VILMA score 3 Will admit on an observation basis to telemetry , serial cardiac enzymes   proceed with stress test in a m    will obtain echocardiogram to assess left ventricle function rule out dyskinesia or wall motion abnormality  Check lipid panel in a m  Check hemoglobin A1c in a m  Continue aspirin beta-blocker statin ACE-inhibitor        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 diabetic nephropathy ([address] )   Assessment & Plan     Hold oral hypoglycemic  Last known hemoglobin A1c 8 9  , will order hemoglobin A1c in a m  Continue Accu-Cheks insulin sliding scale           Essential hypertension with goal blood pressure less than 140/90   Assessment & Plan     Continue lisinopril ,beta-blocker, amlodipine  Monitor blood pressure        Other hyperlipidemia   Assessment & Plan     Check fasting lipid panel  Continue atorvastatin high dose           VTE Prophylaxis: Enoxaparin (Lovenox)  / sequential compression device   Code Status: full  Anticipated Length of Stay:  Patient will be admitted on an Observation basis with an anticipated length of stay of  < 2 midnights     Justification for Hospital Stay:  Stress test    Admission Orders:  Scheduled Meds:   Current Facility-Administered Medications:  acetaminophen 650 mg Oral Q6H PRN Manan Leiva MD    amLODIPine 10 mg Oral Daily Manan Leiva MD    aspirin 81 mg Oral Daily Manan Leiva MD    atorvastatin 80 mg Oral Daily With Zoila Magallanes MD    docusate sodium 100 mg Oral BID Manan Leiva MD    enoxaparin 40 mg Subcutaneous Daily Manan Leiva MD    insulin lispro 1-6 Units Subcutaneous TID AC Manan Leiva MD    insulin lispro 1-6 Units Subcutaneous HS Manan Leiva MD    labetalol 10 mg Intravenous Q4H PRN Manan Leiva MD    lisinopril 20 mg Oral Daily Oscar Fierro MD    metoprolol tartrate 25 mg Oral Daily Oscar Fierro MD    nitroglycerin 0 3 mg Sublingual Q5 Min PRN Oscar Fierro MD    ondansetron 4 mg Intravenous Q6H PRN Oscar Fierro MD    polyethylene glycol 17 g Oral Daily Oscar Fierro MD    senna 1 tablet Oral Daily Oscar Fierro MD    sodium chloride (PF) 3 mL Intravenous PRN Aydin Barcenas DO    sodium chloride 75 mL/hr Intravenous Continuous Oscar Fierro MD Last Rate: 75 mL/hr (02/06/18 0230)   tamsulosin 0 4 mg Oral Daily Oscar Fierro MD      Continuous Infusions:   sodium chloride 75 mL/hr Last Rate: 75 mL/hr (02/06/18 0230)     PRN Meds:   acetaminophen    labetalol    nitroglycerin    ondansetron    Insert peripheral IV AND sodium chloride (PF)    Tele  POC glucose ac/hs  SCDs  OOB as kain   Diet Mickey/CHO Controlled; Consistent Carbohydrate Diet Level 2 (5 carb servings/75 grams CHO/meal);  Cardiac TLC 2 3 GM NA

## 2018-02-06 NOTE — SOCIAL WORK
MCG Guide Used for Initial Round: Chest Pain RRG  Optimal GLOS: 1  Hospital Day: 0 days  DC Readiness:   Discharge Readiness  Return to top of Chest Pain RRG - ISC  · Discharge readiness is indicated by patient meeting Recovery Milestones, including ALL of the following:  ? Acute coronary syndrome ruled out  ? No identification of etiology of pain requiring inpatient care  ? Pain absent or managed  ? Ambulatory  ? Oral hydration, medications, and diet  ?  Discharge plans and education understood    Identified Barriers: Plan for echo and nuclear stress test today  Discussion Date (Time): 02/06/18 with Dr Devin Clemons

## 2018-02-06 NOTE — ASSESSMENT & PLAN NOTE
Patient presented with nonexertional, reproducible chest pain not relieved with nitro   pain atypical   given multiple risk factors and VILMA score 3 Will admit on an observation basis to telemetry , serial cardiac enzymes   proceed with stress test in a m    will obtain echocardiogram to assess left ventricle function rule out dyskinesia or wall motion abnormality  Check lipid panel in a m  Check hemoglobin A1c in a m    Continue aspirin beta-blocker statin ACE-inhibitor

## 2018-02-06 NOTE — ED ATTENDING ATTESTATION
Chinmay Figueroa DO, saw and evaluated the patient  I have discussed the patient with the resident/non-physician practitioner and agree with the resident's/non-physician practitioner's findings, Plan of Care, and MDM as documented in the resident's/non-physician practitioner's note, except where noted  All available labs and Radiology studies were reviewed  At this point I agree with the current assessment done in the Emergency Department  I have conducted an independent evaluation of this patient a history and physical is as follows:    66 yo male presents for evaluation of acute onset L sided chest pressure  Non radiating, started 2 hours ago while playing dominoes  Started as a 7/10 now 5/10  Denies hx of similar pain  Tried SL NTG without any change in symptoms  Pain constant since onset  No a/e factors  Pt is a former smoker, hasn't seen cardiology in 2 years (when he was last admitted for chest pain/ACS eval)  Denies associated dyspnea, vomiting, leg pain or swelling, diaphoresis        Critical Care Time  CritCare Time    Procedures

## 2018-02-06 NOTE — ED PROVIDER NOTES
History  Chief Complaint   Patient presents with    Chest Pain     Pt c/o CP for 2 hours  68 y/o male, hx of CAD, DM, and HTN, presents to the ED for chest pain x 2 hrs  Patient states that he developed left sided pressure 2 hours ago, while playing dominos  He states that pain has been constant since and has improved slightly 5/10 from 7/10 when it initially started  Denies any radiation of the pain, sob, n/v, diaphoresis, cough, or f/c  Nothing worsens or improves the symptoms  States that he takes a baby aspirin daily, which he took earlier today  Also took 1 sl nitro without any relief  Has not had pain like this before  States he has not seen a cardiologist in years  He is a former smoker  No other complaints  History provided by:  Patient  Chest Pain   Pain location:  L chest  Pain quality: pressure    Pain radiates to:  Does not radiate  Pain radiates to the back: no    Pain severity:  Mild  Onset quality:  Sudden  Duration:  2 hours  Timing:  Constant  Progression:  Improving  Chronicity:  New  Context: at rest    Context: not breathing    Relieved by:  Nothing  Worsened by:  Nothing tried  Ineffective treatments:  Aspirin and nitroglycerin  Associated symptoms: no abdominal pain, no back pain, no cough, no diaphoresis, no fever, no headache, no lower extremity edema, no nausea, no numbness, no shortness of breath, not vomiting and no weakness    Risk factors: coronary artery disease, diabetes mellitus, hypertension and smoking    Risk factors: no high cholesterol        Prior to Admission Medications   Prescriptions Last Dose Informant Patient Reported? Taking? amLODIPine (NORVASC) 10 mg tablet 2/5/2018 at morning  Yes Yes   Sig: Take 10 mg by mouth daily  aspirin (ECOTRIN LOW STRENGTH) 81 mg EC tablet 2/5/2018 at morning  Yes Yes   Sig: Take 81 mg by mouth daily  atorvastatin (LIPITOR) 80 mg tablet 2/5/2018 at morning  Yes Yes   Sig: Take 80 mg by mouth daily     lisinopril (ZESTRIL) 20 mg tablet 2/5/2018 at morning time  Yes Yes   Sig: Take 20 mg by mouth daily  metFORMIN (GLUCOPHAGE) 1000 MG tablet 2/5/2018 at dinner time  Yes Yes   Sig: Take 1,000 mg by mouth 2 (two) times a day with meals  metoprolol tartrate (LOPRESSOR) 25 mg tablet 2/5/2018 at dinner time  Yes Yes   Sig: Take 25 mg by mouth daily  nitroglycerin (NITROSTAT) 0 3 mg SL tablet Past Month at prn  Yes Yes   Sig: Place 0 3 mg under the tongue every 5 (five) minutes as needed for chest pain  tamsulosin (FLOMAX) 0 4 mg 2/5/2018 at morning  Yes Yes   Sig: Take 0 4 mg by mouth daily  Facility-Administered Medications: None       Past Medical History:   Diagnosis Date    Asthma     CAD (coronary artery disease) 7/25/2016    Cardiac disease     Diabetes mellitus ([facility] [address] )     Hypertension        History reviewed  No pertinent surgical history  History reviewed  No pertinent family history  I have reviewed and agree with the history as documented  Social History   Substance Use Topics    Smoking status: Former Smoker    Smokeless tobacco: Never Used    Alcohol use No        Review of Systems   Constitutional: Negative for chills, diaphoresis and fever  HENT: Negative for congestion, ear pain and sore throat  Eyes: Negative for pain and visual disturbance  Respiratory: Negative for cough, shortness of breath and wheezing  Cardiovascular: Positive for chest pain  Negative for leg swelling  Gastrointestinal: Negative for abdominal pain, diarrhea, nausea and vomiting  Genitourinary: Negative for dysuria, frequency, hematuria and urgency  Musculoskeletal: Negative for back pain, neck pain and neck stiffness  Skin: Negative for rash and wound  Neurological: Negative for weakness, numbness and headaches  Psychiatric/Behavioral: Negative for agitation and confusion  All other systems reviewed and are negative        Physical Exam  ED Triage Vitals [02/05/18 2240]   Temperature Pulse Respirations Blood Pressure SpO2   97 7 °F (36 5 °C) 78 18 153/71 99 %      Temp Source Heart Rate Source Patient Position - Orthostatic VS BP Location FiO2 (%)   Tympanic Monitor Lying Left arm --      Pain Score       7           Orthostatic Vital Signs  Vitals:    02/06/18 0250 02/06/18 0825 02/06/18 1123 02/06/18 1537   BP: 156/69 145/63 140/64 147/70   Pulse: 70 66 66 64   Patient Position - Orthostatic VS:  Sitting Lying Lying       Physical Exam   Constitutional: He is oriented to person, place, and time  He appears well-developed and well-nourished  HENT:   Head: Normocephalic and atraumatic  Mouth/Throat: Oropharynx is clear and moist    Eyes: EOM are normal  Pupils are equal, round, and reactive to light  Neck: Normal range of motion  Neck supple  Cardiovascular: Normal rate and regular rhythm  Pulmonary/Chest: Effort normal and breath sounds normal  No respiratory distress  He has no wheezes  He has no rales  He exhibits no tenderness  Abdominal: Soft  Bowel sounds are normal  He exhibits no distension  There is no tenderness  Musculoskeletal: Normal range of motion  He exhibits no edema  Neurological: He is alert and oriented to person, place, and time  No focal deficits   Skin: Skin is warm and dry  Nursing note and vitals reviewed        ED Medications  Medications   sodium chloride 0 9 % infusion (0 mL/hr Intravenous Stopped 2/6/18 1748)   aspirin chewable tablet 324 mg (324 mg Oral Given 2/5/18 2316)       Diagnostic Studies  Results Reviewed     Procedure Component Value Units Date/Time    Lipid panel [[id number]]  (Abnormal) Collected:  02/06/18 0412    Lab Status:  Final result Specimen:  Blood from Arm, Left Updated:  02/06/18 0750     Cholesterol 143 mg/dL      Triglycerides 73 mg/dL      HDL, Direct 62 (H) mg/dL      LDL Calculated 66 mg/dL     Narrative:         Triglyceride:        Normal               <150 mg/dl        Borderline High     150-199 mg/dl        High               200-499 mg/dl      Very High           >499 mg/dl      Basic metabolic panel [[id number]]  (Abnormal) Collected:  02/06/18 0412    Lab Status:  Final result Specimen:  Blood from Arm, Left Updated:  02/06/18 0750     Sodium 140 mmol/L      Potassium 3 8 mmol/L      Chloride 106 mmol/L      CO2 27 mmol/L      Anion Gap 7 mmol/L      BUN 9 mg/dL      Creatinine 0 83 mg/dL      Glucose 121 mg/dL      Glucose, Fasting 121 (H) mg/dL      Calcium 8 7 mg/dL      eGFR 87 ml/min/1 73sq m     Narrative:         National Kidney Disease Education Program recommendations are as follows:  GFR calculation is accurate only with a steady state creatinine  Chronic Kidney disease less than 60 ml/min/1 73 sq  meters  Kidney failure less than 15 ml/min/1 73 sq  meters  Platelet count [[id number]]  (Normal) Collected:  02/06/18 0417    Lab Status:  Final result Specimen:  Blood from Arm, Left Updated:  02/06/18 0742     Platelets 879 Thousands/uL      MPV 10 4 fL     Hemoglobin A1c [[id number]]  (Abnormal) Collected:  02/06/18 0413    Lab Status:  Final result Specimen:  Blood from Arm, Left Updated:  02/06/18 0736     Hemoglobin A1C 8 6 (H) %       mg/dl     Troponin I [[id number]]  (Normal) Collected:  02/06/18 0417    Lab Status:  Final result Specimen:  Blood from Arm, Left Updated:  02/06/18 0726     Troponin I <0 02 ng/mL     Narrative:         [employer] Chemistry analyzer 99% cutoff is > 0 04 ng/mL in network labs    o cTnI 99% cutoff is useful only when applied to patients in the clinical setting of myocardial ischemia  o cTnI 99% cutoff should be interpreted in the context of clinical history, ECG findings and possibly cardiac imaging to establish correct diagnosis  o cTnI 99% cutoff may be suggestive but clearly not indicative of a coronary event without the clinical setting of myocardial ischemia      Troponin I [[id number]]  (Normal) Collected:  02/05/18 7613    Lab Status:  Final result Specimen:  Blood from Arm, Right Updated:  02/05/18 2340     Troponin I <0 02 ng/mL     Narrative:         [employer] Chemistry analyzer 99% cutoff is > 0 04 ng/mL in network labs    o cTnI 99% cutoff is useful only when applied to patients in the clinical setting of myocardial ischemia  o cTnI 99% cutoff should be interpreted in the context of clinical history, ECG findings and possibly cardiac imaging to establish correct diagnosis  o cTnI 99% cutoff may be suggestive but clearly not indicative of a coronary event without the clinical setting of myocardial ischemia  Basic metabolic panel [[id number]] Collected:  02/05/18 2315    Lab Status:  Final result Specimen:  Blood from Arm, Right Updated:  02/05/18 2336     Sodium 136 mmol/L      Potassium 3 7 mmol/L      Chloride 101 mmol/L      CO2 29 mmol/L      Anion Gap 6 mmol/L      BUN 10 mg/dL      Creatinine 0 94 mg/dL      Glucose 131 mg/dL      Calcium 8 9 mg/dL      eGFR 80 ml/min/1 73sq m     Narrative:         National Kidney Disease Education Program recommendations are as follows:  GFR calculation is accurate only with a steady state creatinine  Chronic Kidney disease less than 60 ml/min/1 73 sq  meters  Kidney failure less than 15 ml/min/1 73 sq  meters      CBC and differential [[id number]]  (Abnormal) Collected:  02/05/18 2315    Lab Status:  Final result Specimen:  Blood from Arm, Right Updated:  02/05/18 2322     WBC 10 85 (H) Thousand/uL      RBC 4 28 Million/uL      Hemoglobin 12 8 g/dL      Hematocrit 37 8 %      MCV 88 fL      MCH 29 9 pg      MCHC 33 9 g/dL      RDW 14 4 %      MPV 10 2 fL      Platelets 448 Thousands/uL      nRBC 0 /100 WBCs      Neutrophils Relative 60 %      Lymphocytes Relative 29 %      Monocytes Relative 8 %      Eosinophils Relative 2 %      Basophils Relative 1 %      Neutrophils Absolute 6 49 Thousands/µL      Lymphocytes Absolute 3 13 Thousands/µL      Monocytes Absolute 0 91 Thousand/µL      Eosinophils Absolute 0 23 Thousand/µL      Basophils Absolute 0 07 Thousands/µL X-ray chest 2 views   ED Interpretation by Danika Thorpe DO (02/06 0011)   NAP      Final Result by Lavern Macdonald MD (02/06 0444)      No active pulmonary disease        Workstation performed: [id number]               Procedures  ECG 12 Lead Documentation  Date/Time: 2/6/2018 12:00 AM  Performed by: Joanie López  Authorized by: Milagros Acosta     Indications / Diagnosis:  Chest pain   ECG reviewed by me, the ED Provider: yes    Patient location:  ED  Previous ECG:     Previous ECG:  Compared to current    Comparison ECG info:  7/25/16    Similarity:  No change  Rate:     ECG rate:  74    ECG rate assessment: normal    Rhythm:     Rhythm: sinus rhythm    Ectopy:     Ectopy: none    QRS:     QRS axis:  Left    QRS intervals:  Normal  ST segments:     ST segments: no acute changes   T waves:     T waves: normal    Other findings:     Other findings: poor R wave progression              Phone Consults  ED Phone Contact    ED Course  ED Course as of Feb 11 0311   Tue Feb 06, 2018   0022 SLIM paged           HEART Risk Score    Flowsheet Row Most Recent Value   History  1 Filed at: 02/05/2018 2306   ECG  1 Filed at: 02/06/2018 0007   Age  2 Filed at: 02/05/2018 2306   Risk Factors  2 Filed at: 02/05/2018 2306   Troponin  0 Filed at: 02/06/2018 0007   Heart Score Risk Calculator   History  1 Filed at: 02/05/2018 2306   ECG  1 Filed at: 02/06/2018 0007   Age  2 Filed at: 02/05/2018 2306   Risk Factors  2 Filed at: 02/05/2018 2306   Troponin  0 Filed at: 02/06/2018 0007   HEART Score  No data   HEART Score  No data                    VILMA Risk Score    Flowsheet Row Most Recent Value   Age >= 72  1 Filed at: 02/06/2018 8794   Known CAD (stenosis >= 50%)  No data   Recent (<=24 hrs) Service Angina  No data   ST Deviation >= 0 5 mm  No data   3+ CAD Risk Factors (FHx, HTN, HLP, DM, Smoker)  1 Filed at: 02/06/2018 0034   Aspirin Use Past 7 Days  1 Filed at: 02/06/2018 0034   Elevated Cardiac Markers  No data   VILMA Risk Score (Calculated)  No data              MDM  Number of Diagnoses or Management Options  Chest pain: new and requires workup  Diagnosis management comments: Patient with chest pain- will get cardiac workup and admit for acs r/o  Patient reevaluated and feels improved  Patient updated on results of tests and plan of care including admission to hospital for further evaluation of presenting complaint  Patient demonstrates verbal understanding and agrees with plan  Report to Dr Leann Boyd  with [facility] for continuation of patient care  Amount and/or Complexity of Data Reviewed  Clinical lab tests: ordered and reviewed  Tests in the radiology section of CPT®: ordered and reviewed  Tests in the medicine section of CPT®: ordered and reviewed  Discussion of test results with the performing providers: yes  Decide to obtain previous medical records or to obtain history from someone other than the patient: yes  Obtain history from someone other than the patient: yes  Review and summarize past medical records: yes  Discuss the patient with other providers: yes  Independent visualization of images, tracings, or specimens: yes    Patient Progress  Patient progress: improved    CritCare Time    Disposition  Final diagnoses:   Chest pain     Time reflects when diagnosis was documented in both MDM as applicable and the Disposition within this note     Time User Action Codes Description Comment    2/6/2018 12:14 AM Lyubov Genao Add [R07 9] Chest pain     2/6/2018  5:39 PM Damien Cole Add [R07 89] Other chest pain       ED Disposition     ED Disposition Condition Comment    Admit  Case was discussed with KIR[facility] and the patient's admission status was agreed to be Admission Status: observation status to the service of Dr Celestino Negrete           Follow-up Information     Follow up With Specialties Details Why Contact Jatinder Guillermo MD Family Medicine Schedule an appointment as soon as possible for a visit in 7 day(s)  [address] [facility]  [phone]          Discharge Medication List as of 2/6/2018  5:50 PM      START taking these medications    Details   acetaminophen (TYLENOL) 325 mg tablet Take 2 tablets (650 mg total) by mouth every 6 (six) hours as needed for mild pain, headaches or fever, Starting Tue 2/6/2018, No Print         CONTINUE these medications which have NOT CHANGED    Details   amLODIPine (NORVASC) 10 mg tablet Take 10 mg by mouth daily  , Until Discontinued, Historical Med      aspirin (ECOTRIN LOW STRENGTH) 81 mg EC tablet Take 81 mg by mouth daily  , Until Discontinued, Historical Med      atorvastatin (LIPITOR) 80 mg tablet Take 80 mg by mouth daily  , Until Discontinued, Historical Med      lisinopril (ZESTRIL) 20 mg tablet Take 20 mg by mouth daily  , Until Discontinued, Historical Med      metFORMIN (GLUCOPHAGE) 1000 MG tablet Take 1,000 mg by mouth 2 (two) times a day with meals  , Until Discontinued, Historical Med      metoprolol tartrate (LOPRESSOR) 25 mg tablet Take 25 mg by mouth daily  , Until Discontinued, Historical Med      nitroglycerin (NITROSTAT) 0 3 mg SL tablet Place 0 3 mg under the tongue every 5 (five) minutes as needed for chest pain , Until Discontinued, Historical Med      tamsulosin (FLOMAX) 0 4 mg Take 0 4 mg by mouth daily  , Until Discontinued, Historical Med             Outpatient Discharge Orders  Discharge Diet     Activity as tolerated     Call provider for:  persistent nausea or vomiting     Call provider for:  severe uncontrolled pain     Call provider for:  redness, tenderness, or signs of infection (pain, swelling, redness, odor or green/yellow discharge around incision site)     Call provider for: active or persistent bleeding     Call provider for:  difficulty breathing, headache or visual disturbances     Call provider for:  hives     Call provider for:  persistent dizziness or light-headedness     Call provider for:  extreme fatigue     Call provider for:         ED Provider  Attending physically available and evaluated Amilcarbenjamín Duran  ESPERANZA managed the patient along with the ED Attending      Electronically Signed by         Jose Robert DO  02/11/18 1728

## 2018-02-06 NOTE — H&P
H&P- Prachi Perez 1945, 67 y o  male MRN: [id number]    Unit/Bed#: [facility] 727-01 Encounter: [id number]    Primary Care Provider: Haroldo Watson MD   Date and time admitted to hospital: 2/5/2018 10:33 PM        * Other chest pain   Assessment & Plan    Patient presented with nonexertional, reproducible chest pain not relieved with nitro   pain atypical   given multiple risk factors and VILMA score 3 Will admit on an observation basis to telemetry , serial cardiac enzymes   proceed with stress test in a m    will obtain echocardiogram to assess left ventricle function rule out dyskinesia or wall motion abnormality  Check lipid panel in a m  Check hemoglobin A1c in a m  Continue aspirin beta-blocker statin ACE-inhibitor        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 diabetic nephropathy ([facility] [address] )   Assessment & Plan    Hold oral hypoglycemic  Last known hemoglobin A1c 8 9  , will order hemoglobin A1c in a m  Continue Accu-Cheks insulin sliding scale          Essential hypertension with goal blood pressure less than 140/90   Assessment & Plan    Continue lisinopril ,beta-blocker, amlodipine  Monitor blood pressure        Other hyperlipidemia   Assessment & Plan    Check fasting lipid panel  Continue atorvastatin high dose              VTE Prophylaxis: Enoxaparin (Lovenox)  / sequential compression device   Code Status: full  POLST: There is no POLST form on file for this patient (pre-hospital)      Anticipated Length of Stay:  Patient will be admitted on an Observation basis with an anticipated length of stay of  < 2 midnights  Justification for Hospital Stay:  Stress test    Total Time for Visit, including Counseling / Coordination of Care: 45 minutes  Greater than 50% of this total time spent on direct patient counseling and coordination of care  Chief Complaint:   Chest    History of Present Illness:    Prachi Perez is a 67 y o    Lao-speaking male  with history of hypertension diabetes hyperlipidemia who presented to the emergency room for evaluation of atypical chest pain started 2 hours prior admission  Patient reports acute onset of left-sided nonexertional aching constant CP, not associated with dizziness, diaphoresis or shortness of breath  No alleviating or aggravating factors ( SL nitro did not relieve pain)  2 troponins negative ,EKG no acute changes, chest x-ray normal   Patient was given extra dose of aspirin in the ER  Given multiple risk factors and VILMA score 3 patient admitted on observation basis for inpatient stress test and echocardiogram        Review of Systems:    Review of Systems   Cardiovascular: Positive for chest pain  Past Medical and Surgical History:     Past Medical History:   Diagnosis Date    Asthma     CAD (coronary artery disease) 7/25/2016    Cardiac disease     Diabetes mellitus ([facility] [address]     Hypertension        History reviewed  No pertinent surgical history  Meds/Allergies:    Prior to Admission medications    Medication Sig Start Date End Date Taking? Authorizing Provider   amLODIPine (NORVASC) 10 mg tablet Take 10 mg by mouth daily  Yes Historical Provider, MD   aspirin (ECOTRIN LOW STRENGTH) 81 mg EC tablet Take 81 mg by mouth daily  Yes Historical Provider, MD   atorvastatin (LIPITOR) 80 mg tablet Take 80 mg by mouth daily  Yes Historical Provider, MD   lisinopril (ZESTRIL) 20 mg tablet Take 20 mg by mouth daily  Yes Historical Provider, MD   metFORMIN (GLUCOPHAGE) 1000 MG tablet Take 1,000 mg by mouth 2 (two) times a day with meals  Yes Historical Provider, MD   metoprolol tartrate (LOPRESSOR) 25 mg tablet Take 25 mg by mouth daily  Yes Historical Provider, MD   nitroglycerin (NITROSTAT) 0 3 mg SL tablet Place 0 3 mg under the tongue every 5 (five) minutes as needed for chest pain  Yes Historical Provider, MD   tamsulosin (FLOMAX) 0 4 mg Take 0 4 mg by mouth daily     Yes Historical Provider, MD     I have reviewed home medications with a medical source (PCP, Pharmacy, other)  Allergies: Allergies   Allergen Reactions    Latex        Social History:     Marital Status:    Occupation: none  Patient Pre-hospital Living Situation: home  Patient Pre-hospital Level of Mobility: reg  Patient Pre-hospital Diet Restrictions: reg  Substance Use History:   History   Alcohol Use No     History   Smoking Status    Former Smoker   Smokeless Tobacco    Never Used     History   Drug Use No       Family History:    non-contributory    Physical Exam:     Vitals:   Blood Pressure: 156/69 (02/06/18 0250)  Pulse: 70 (02/06/18 0250)  Temperature: 98 7 °F (37 1 °C) (02/06/18 0209)  Temp Source: Oral (02/06/18 8224)  Respirations: 18 (02/06/18 0209)  Height: 5' 7" (170 2 cm) (02/05/18 2240)  Weight - Scale: 81 6 kg (180 lb) (02/05/18 2240)  SpO2: 100 % (02/06/18 0209)    Physical Exam   Constitutional: He appears well-developed  HENT:   Head: Normocephalic  Eyes: Pupils are equal, round, and reactive to light  Cardiovascular: Regular rhythm  Pulmonary/Chest: No respiratory distress  He exhibits tenderness  Abdominal: He exhibits no distension  Musculoskeletal: He exhibits no edema  Neurological: He is alert  Skin: Skin is warm  Psychiatric: He has a normal mood and affect  Additional Data:     Lab Results: I have personally reviewed pertinent reports  Results from last 7 days  Lab Units 02/05/18  2315   WBC Thousand/uL 10 85*   HEMOGLOBIN g/dL 12 8   HEMATOCRIT % 37 8   PLATELETS Thousands/uL 233   NEUTROS PCT % 60   LYMPHS PCT % 29   MONOS PCT % 8   EOS PCT % 2       Results from last 7 days  Lab Units 02/05/18  2315   SODIUM mmol/L 136   POTASSIUM mmol/L 3 7   CHLORIDE mmol/L 101   CO2 mmol/L 29   BUN mg/dL 10   CREATININE mg/dL 0 94   CALCIUM mg/dL 8 9   GLUCOSE RANDOM mg/dL 131           Imaging: I have personally reviewed pertinent reports        X-ray chest 2 views   ED Interpretation by David Hester [address] [facility],  (02/06 0011)   NAP          EKG, Pathology, and Other Studies Reviewed on Admission:   · EKG: sinus    Allscripts / Epic Records Reviewed: Yes     ** Please Note: This note has been constructed using a voice recognition system   **

## 2018-02-06 NOTE — RESTORATIVE TECHNICIAN NOTE
Restorative Specialist Mobility Note       Activity: Ambulate in [place], Ambulate in room, Bathroom privileges, Chair, Dangle, Stand at bedside (Educated/encouraged pt to ambulate in the halls 3-4 x's/day   Pt callbell, phone/tray within reach )     Assistive Device: None          Sly JEFFREY, Restorative Technician, [employer] [facility]

## 2018-02-06 NOTE — SOCIAL WORK
Attempted to meet with pt x2 and pt unavailable as pt was having an echo completed and then pt was off unit at testing

## 2018-02-06 NOTE — DISCHARGE SUMMARY
[facility]    Patient Information: Geri Duran 67 y o  male MRN: [id number]  [facility]: [id number]    Discharging Physician / Practitioner: Mei Piña MD  PCP: Sebastian Chun MD  Admission Date: 2/5/2018  Discharge Date: 02/06/18    Reason for Admission:  Chest pain  Discharge Diagnoses:   1  Atypical chest pain, likely musculoskeletal in nature as pain was reproducible, resolved; no acute coronary syndrome  Troponins were negative  2   Diabetes mellitus, poorly controlled, with diabetic nephropathy  3   Hypertension  4   Dyslipidemia  Consultations During Hospital Stay:  · None  Procedures Performed:     · Please see hospital course below  Significant Findings:     · Please see hospital course below and diagnosis above  Incidental Findings:   · None  Test Results Pending at Discharge (will require follow up): · None  Outpatient Tests Requested:  · None  However I am strongly recommending a repeat fasting blood sugar/BMP in the outpatient  Patient's primary care physician should facilitate patient having this test     Complications:  None  Hospital Course:     Geri Duran is a 67 y o  male patient who originally presented to the hospital on 2/5/2018 due to chest pains  The chest pain was reproducible  Troponins were done and they were negative  Nuclear stress test was also done and was negative  Echocardiogram revealed normal ejection fraction, with grade 1 diastolic dysfunction and without any significant valvular heart problems  Patient's chest pains had resolved  Patient's hemoglobin A1c was found to be high at 8 9  Thus I am recommending to patient's primary care physician to re-evaluate this in 1 week and likely discuss with the patient possibility of adding other diabetes medications  I am also recommending a repeat fasting blood sugar at that time        Condition at Discharge: stable Discharge Day Visit / Exam:     Subjective:   Patient is doing fine  Patient feels better  Patient denies any more chest pains or any other pains  No shortness of breath  No complaints  Patient is okay with his discharge to home today  Vitals: Blood Pressure: 147/70 (02/06/18 1537)  Pulse: 64 (02/06/18 1537)  Temperature: 98 4 °F (36 9 °C) (02/06/18 1537)  Temp Source: Oral (02/06/18 1537)  Respirations: 18 (02/06/18 1537)  Height: 5' 7" (170 2 cm) (02/05/18 2240)  Weight - Scale: 81 6 kg (180 lb) (02/05/18 2240)  SpO2: 99 % (02/06/18 1537)  Exam:   Physical Exam   Constitutional: He is oriented to person, place, and time  No distress  HENT:   Head: Normocephalic and atraumatic  Eyes: Right eye exhibits no discharge  Left eye exhibits no discharge  No scleral icterus  Neck: No JVD present  No tracheal deviation present  Cardiovascular: Normal rate, regular rhythm and normal heart sounds  Exam reveals no gallop and no friction rub  No murmur heard  Pulmonary/Chest: Effort normal and breath sounds normal  No stridor  No respiratory distress  He has no wheezes  He has no rales  He exhibits no tenderness  Abdominal: Soft  Bowel sounds are normal  He exhibits no distension  There is no tenderness  There is no rebound and no guarding  Musculoskeletal: He exhibits no edema, tenderness or deformity  Neurological: He is alert and oriented to person, place, and time  No cranial nerve deficit  Skin: Skin is warm and dry  No rash noted  He is not diaphoretic  No erythema  No pallor  Psychiatric: He has a normal mood and affect  His behavior is normal  Thought content normal    Vitals reviewed  Discharge instructions/Information to patient and family:   See after visit summary for information provided to patient and family  Provisions for Follow-Up Care:  See after visit summary for information related to follow-up care and any pertinent home health orders        Disposition: Home    Planned Readmission:  None  Discharge Statement:  I spent 40 minutes discharging the patient  This time was spent on the day of discharge  I had direct contact with the patient on the day of discharge  Greater than 50% of the total time was spent examining patient, answering all patient questions, arranging and discussing plan of care with patient as well as directly providing post-discharge instructions  Additional time then spent on discharge activities  Discharge Medications:  See after visit summary for reconciled discharge medications provided to patient and family  ** Please Note: Dragon 360 Dictation voice to text software may have been used in the creation of this document   **

## 2018-02-06 NOTE — ASSESSMENT & PLAN NOTE
Hold oral hypoglycemic  Last known hemoglobin A1c 8 9  , will order hemoglobin A1c in a m    Continue Accu-Cheks insulin sliding scale

## 2018-02-07 LAB
CHEST PAIN STATEMENT: NORMAL
MAX DIASTOLIC BP: 60 MMHG
MAX HEART RATE: 137 BPM
MAX PREDICTED HEART RATE: 147 BPM
MAX. SYSTOLIC BP: 178 MMHG
PROTOCOL NAME: NORMAL
TARGET HR FORMULA: NORMAL
TEST INDICATION: NORMAL
TIME IN EXERCISE PHASE: NORMAL

## 2019-05-07 ENCOUNTER — APPOINTMENT (OUTPATIENT)
Dept: LAB | Facility: CLINIC | Age: 74
End: 2019-05-07
Payer: MEDICARE

## 2019-05-07 ENCOUNTER — TRANSCRIBE ORDERS (OUTPATIENT)
Dept: LAB | Facility: CLINIC | Age: 74
End: 2019-05-07

## 2019-05-07 DIAGNOSIS — E11.8 TYPE 2 DIABETES MELLITUS WITH COMPLICATION, UNSPECIFIED WHETHER LONG TERM INSULIN USE: Primary | ICD-10-CM

## 2019-05-07 DIAGNOSIS — E11.8 TYPE 2 DIABETES MELLITUS WITH COMPLICATION, UNSPECIFIED WHETHER LONG TERM INSULIN USE: ICD-10-CM

## 2019-05-07 LAB
ALBUMIN SERPL BCP-MCNC: 3.8 G/DL (ref 3.5–5)
ALP SERPL-CCNC: 84 U/L (ref 46–116)
ALT SERPL W P-5'-P-CCNC: 33 U/L (ref 12–78)
ANION GAP SERPL CALCULATED.3IONS-SCNC: 6 MMOL/L (ref 4–13)
AST SERPL W P-5'-P-CCNC: 20 U/L (ref 5–45)
BILIRUB SERPL-MCNC: 0.63 MG/DL (ref 0.2–1)
BUN SERPL-MCNC: 10 MG/DL (ref 5–25)
CALCIUM SERPL-MCNC: 8.4 MG/DL (ref 8.3–10.1)
CHLORIDE SERPL-SCNC: 104 MMOL/L (ref 100–108)
CHOLEST SERPL-MCNC: 159 MG/DL (ref 50–200)
CO2 SERPL-SCNC: 27 MMOL/L (ref 21–32)
CREAT SERPL-MCNC: 0.97 MG/DL (ref 0.6–1.3)
CREAT UR-MCNC: 107 MG/DL
GFR SERPL CREATININE-BSD FRML MDRD: 77 ML/MIN/1.73SQ M
GLUCOSE P FAST SERPL-MCNC: 91 MG/DL (ref 65–99)
HDLC SERPL-MCNC: 57 MG/DL (ref 40–60)
LDLC SERPL CALC-MCNC: 80 MG/DL (ref 0–100)
LDLC SERPL DIRECT ASSAY-MCNC: 83 MG/DL (ref 0–100)
MICROALBUMIN UR-MCNC: 5.1 MG/L (ref 0–20)
MICROALBUMIN/CREAT 24H UR: 5 MG/G CREATININE (ref 0–30)
NONHDLC SERPL-MCNC: 102 MG/DL
POTASSIUM SERPL-SCNC: 3.9 MMOL/L (ref 3.5–5.3)
PROT SERPL-MCNC: 7.5 G/DL (ref 6.4–8.2)
SODIUM SERPL-SCNC: 137 MMOL/L (ref 136–145)
TRIGL SERPL-MCNC: 112 MG/DL

## 2019-05-07 PROCEDURE — 82043 UR ALBUMIN QUANTITATIVE: CPT

## 2019-05-07 PROCEDURE — 80061 LIPID PANEL: CPT

## 2019-05-07 PROCEDURE — 36415 COLL VENOUS BLD VENIPUNCTURE: CPT

## 2019-05-07 PROCEDURE — 82570 ASSAY OF URINE CREATININE: CPT

## 2019-05-07 PROCEDURE — 83721 ASSAY OF BLOOD LIPOPROTEIN: CPT

## 2019-05-07 PROCEDURE — 80053 COMPREHEN METABOLIC PANEL: CPT
